# Patient Record
Sex: MALE | Race: BLACK OR AFRICAN AMERICAN | NOT HISPANIC OR LATINO
[De-identification: names, ages, dates, MRNs, and addresses within clinical notes are randomized per-mention and may not be internally consistent; named-entity substitution may affect disease eponyms.]

---

## 2017-02-10 ENCOUNTER — LABORATORY RESULT (OUTPATIENT)
Age: 57
End: 2017-02-10

## 2017-02-10 ENCOUNTER — APPOINTMENT (OUTPATIENT)
Dept: INTERNAL MEDICINE | Facility: CLINIC | Age: 57
End: 2017-02-10

## 2017-02-10 VITALS
DIASTOLIC BLOOD PRESSURE: 75 MMHG | OXYGEN SATURATION: 94 % | HEART RATE: 82 BPM | TEMPERATURE: 98.9 F | SYSTOLIC BLOOD PRESSURE: 152 MMHG

## 2017-02-10 VITALS — HEIGHT: 70 IN | BODY MASS INDEX: 36.79 KG/M2 | WEIGHT: 257 LBS

## 2017-02-10 LAB
BILIRUB UR QL STRIP: NEGATIVE
CLARITY UR: CLEAR
COLLECTION METHOD: NORMAL
GLUCOSE UR-MCNC: NEGATIVE
HCG UR QL: 0.2 EU/DL
HGB UR QL STRIP.AUTO: NEGATIVE
KETONES UR-MCNC: NORMAL
LEUKOCYTE ESTERASE UR QL STRIP: NEGATIVE
NITRITE UR QL STRIP: NEGATIVE
PH UR STRIP: 5
PROT UR STRIP-MCNC: NEGATIVE
SP GR UR STRIP: >=1.03

## 2017-02-12 LAB
25(OH)D3 SERPL-MCNC: 22.3 NG/ML
ALBUMIN SERPL ELPH-MCNC: 4.7 G/DL
ALP BLD-CCNC: 82 U/L
ALT SERPL-CCNC: 25 U/L
ANION GAP SERPL CALC-SCNC: 14 MMOL/L
AST SERPL-CCNC: 25 U/L
BASOPHILS # BLD AUTO: 0.03 K/UL
BASOPHILS NFR BLD AUTO: 0.5 %
BILIRUB SERPL-MCNC: 0.8 MG/DL
BUN SERPL-MCNC: 16 MG/DL
CALCIUM SERPL-MCNC: 10.6 MG/DL
CHLORIDE SERPL-SCNC: 101 MMOL/L
CHOLEST SERPL-MCNC: 180 MG/DL
CHOLEST/HDLC SERPL: 3.9 RATIO
CO2 SERPL-SCNC: 26 MMOL/L
CREAT SERPL-MCNC: 1.1 MG/DL
CREAT SPEC-SCNC: 205 MG/DL
EOSINOPHIL # BLD AUTO: 0.11 K/UL
EOSINOPHIL NFR BLD AUTO: 1.9 %
GLUCOSE SERPL-MCNC: 136 MG/DL
HBA1C MFR BLD HPLC: 7.5 %
HCT VFR BLD CALC: 45.4 %
HDLC SERPL-MCNC: 46 MG/DL
HGB BLD-MCNC: 14.1 G/DL
IMM GRANULOCYTES NFR BLD AUTO: 0.2 %
LDLC SERPL CALC-MCNC: 105 MG/DL
LYMPHOCYTES # BLD AUTO: 2.16 K/UL
LYMPHOCYTES NFR BLD AUTO: 38.1 %
MAN DIFF?: NORMAL
MCHC RBC-ENTMCNC: 29.1 PG
MCHC RBC-ENTMCNC: 31.1 GM/DL
MCV RBC AUTO: 93.6 FL
MICROALBUMIN 24H UR DL<=1MG/L-MCNC: 1.5 MG/DL
MICROALBUMIN/CREAT 24H UR-RTO: 7 UG/MG
MONOCYTES # BLD AUTO: 0.46 K/UL
MONOCYTES NFR BLD AUTO: 8.1 %
NEUTROPHILS # BLD AUTO: 2.9 K/UL
NEUTROPHILS NFR BLD AUTO: 51.2 %
PLATELET # BLD AUTO: 267 K/UL
POTASSIUM SERPL-SCNC: 4.7 MMOL/L
PROT SERPL-MCNC: 7.4 G/DL
PSA SERPL-MCNC: 5.39 NG/ML
RBC # BLD: 4.85 M/UL
RBC # FLD: 14.1 %
SODIUM SERPL-SCNC: 140 MMOL/L
T4 SERPL-MCNC: 6.4 UG/DL
TRIGL SERPL-MCNC: 143 MG/DL
TSH SERPL-ACNC: 1.92 UIU/ML
WBC # FLD AUTO: 5.67 K/UL

## 2017-06-01 ENCOUNTER — APPOINTMENT (OUTPATIENT)
Dept: INTERNAL MEDICINE | Facility: CLINIC | Age: 57
End: 2017-06-01

## 2017-06-01 VITALS
SYSTOLIC BLOOD PRESSURE: 144 MMHG | WEIGHT: 263 LBS | DIASTOLIC BLOOD PRESSURE: 87 MMHG | BODY MASS INDEX: 37.74 KG/M2 | HEART RATE: 80 BPM | OXYGEN SATURATION: 93 %

## 2017-08-21 ENCOUNTER — MEDICATION RENEWAL (OUTPATIENT)
Age: 57
End: 2017-08-21

## 2017-09-20 ENCOUNTER — MED ADMIN CHARGE (OUTPATIENT)
Age: 57
End: 2017-09-20

## 2017-12-05 ENCOUNTER — MEDICATION RENEWAL (OUTPATIENT)
Age: 57
End: 2017-12-05

## 2018-11-27 ENCOUNTER — MEDICATION RENEWAL (OUTPATIENT)
Age: 58
End: 2018-11-27

## 2018-12-03 ENCOUNTER — APPOINTMENT (OUTPATIENT)
Dept: INTERNAL MEDICINE | Facility: CLINIC | Age: 58
End: 2018-12-03

## 2019-01-04 ENCOUNTER — NON-APPOINTMENT (OUTPATIENT)
Age: 59
End: 2019-01-04

## 2019-01-04 ENCOUNTER — APPOINTMENT (OUTPATIENT)
Dept: INTERNAL MEDICINE | Facility: CLINIC | Age: 59
End: 2019-01-04
Payer: COMMERCIAL

## 2019-01-04 VITALS
DIASTOLIC BLOOD PRESSURE: 85 MMHG | SYSTOLIC BLOOD PRESSURE: 140 MMHG | OXYGEN SATURATION: 96 % | HEIGHT: 70 IN | TEMPERATURE: 98.3 F | BODY MASS INDEX: 35.07 KG/M2 | HEART RATE: 79 BPM | WEIGHT: 245 LBS

## 2019-01-04 DIAGNOSIS — H60.92 UNSPECIFIED OTITIS EXTERNA, LEFT EAR: ICD-10-CM

## 2019-01-04 DIAGNOSIS — Z86.69 PERSONAL HISTORY OF OTHER DISEASES OF THE NERVOUS SYSTEM AND SENSE ORGANS: ICD-10-CM

## 2019-01-04 DIAGNOSIS — M54.12 RADICULOPATHY, CERVICAL REGION: ICD-10-CM

## 2019-01-04 PROCEDURE — 36415 COLL VENOUS BLD VENIPUNCTURE: CPT

## 2019-01-04 PROCEDURE — 93000 ELECTROCARDIOGRAM COMPLETE: CPT

## 2019-01-04 PROCEDURE — 99396 PREV VISIT EST AGE 40-64: CPT | Mod: 25

## 2019-01-04 PROCEDURE — 81003 URINALYSIS AUTO W/O SCOPE: CPT | Mod: QW

## 2019-01-04 RX ORDER — COLISTIN SULFATE, NEOMYCIN SULFATE, THONZONIUM BROMIDE AND HYDROCORTISONE ACETATE 3; 3.3; .5; 1 MG/ML; MG/ML; MG/ML; MG/ML
3.3-3-10-0.5 SUSPENSION AURICULAR (OTIC) 4 TIMES DAILY
Qty: 1 | Refills: 0 | Status: DISCONTINUED | OUTPATIENT
Start: 2017-06-01 | End: 2019-01-04

## 2019-01-04 RX ORDER — SILDENAFIL CITRATE 100 MG/1
100 TABLET, FILM COATED ORAL
Qty: 6 | Refills: 3 | Status: DISCONTINUED | COMMUNITY
Start: 2017-08-21 | End: 2019-01-04

## 2019-01-04 RX ORDER — LISINOPRIL 5 MG/1
5 TABLET ORAL
Qty: 90 | Refills: 3 | Status: DISCONTINUED | COMMUNITY
Start: 2017-02-10 | End: 2019-01-04

## 2019-01-04 NOTE — HISTORY OF PRESENT ILLNESS
[FreeTextEntry1] : Annual exam [de-identified] : Annual exam\par Decline vaccine\par last colon 2009\par \par Diabetic last A1c 7.3\par does not do FS\par no recent opth??\par last visit elevated psa did not f/u urologist\par See cardiogist Osvaldo Madison\par on metformin, Ace and statin\par Works construction\par some left hand issue\par some tightening of right shoulder muscle\par no sob or cp\par after sleep left hand numb\par couple days ago awoke with headaches\par went to er ct neg, chest x ray and ekg all negative\par now headaches better\par sleep apnea not using mask at this time?

## 2019-01-04 NOTE — HEALTH RISK ASSESSMENT
[Good] : ~his/her~  mood as  good [No falls in past year] : Patient reported no falls in the past year [0] : 2) Feeling down, depressed, or hopeless: Not at all (0) [Patient reported colonoscopy was normal] : Patient reported colonoscopy was normal [None] : None [With Family] : lives with family [] : No [[id number]] : 0 [Change in mental status noted] : No change in mental status noted [Language] : denies difficulty with language [Behavior] : denies difficulty with behavior [Learning/Retaining New Information] : denies difficulty learning/retaining new information [Handling Complex Tasks] : denies difficulty handling complex tasks [Reasoning] : denies difficulty with reasoning [Spatial Ability and Orientation] : denies difficulty with spatial ability and orientation [ColonoscopyDate] : 4/09

## 2019-01-04 NOTE — PHYSICAL EXAM
[No Acute Distress] : no acute distress [Normal Outer Ear/Nose] : the outer ears and nose were normal in appearance [Normal Oropharynx] : the oropharynx was normal [No JVD] : no jugular venous distention [Supple] : supple [No Respiratory Distress] : no respiratory distress  [Clear to Auscultation] : lungs were clear to auscultation bilaterally [Normal Rate] : normal rate  [Regular Rhythm] : with a regular rhythm [No Carotid Bruits] : no carotid bruits [Pedal Pulses Present] : the pedal pulses are present [No Edema] : there was no peripheral edema [No Extremity Clubbing/Cyanosis] : no extremity clubbing/cyanosis [Soft] : abdomen soft [No HSM] : no HSM [Stool Occult Blood] : stool negative for occult blood [FreeTextEntry1] : prostate normal exam [de-identified] : right big toe nail issue

## 2019-01-04 NOTE — REVIEW OF SYSTEMS
[Fever] : no fever [Discharge] : no discharge [Earache] : no earache [Hearing Loss] : no hearing loss [Nasal Discharge] : no nasal discharge [Chest Pain] : no chest pain [Orthopena] : no orthopnea [Shortness Of Breath] : no shortness of breath [Wheezing] : no wheezing [Abdominal Pain] : no abdominal pain [Vomiting] : no vomiting [Joint Pain] : no joint pain [Back Pain] : no back pain

## 2019-01-04 NOTE — PLAN
[FreeTextEntry1] : Annual exam\par Diabetes on med \par not on statin or ace\par no eye or foot\par \par \par Full blood evaluation\par urine to check protein\par check cholesterol \par bp good but will check for micro albumin consider ace\par need f/u urology\par

## 2019-01-05 LAB
25(OH)D3 SERPL-MCNC: 20 NG/ML
ALBUMIN SERPL ELPH-MCNC: 4.5 G/DL
ALP BLD-CCNC: 50 U/L
ALT SERPL-CCNC: 20 U/L
ANION GAP SERPL CALC-SCNC: 10 MMOL/L
AST SERPL-CCNC: 25 U/L
BASOPHILS # BLD AUTO: 0.03 K/UL
BASOPHILS NFR BLD AUTO: 0.8 %
BILIRUB SERPL-MCNC: 1.6 MG/DL
BILIRUB UR QL STRIP: NEGATIVE
BUN SERPL-MCNC: 11 MG/DL
CALCIUM SERPL-MCNC: 9.6 MG/DL
CHLORIDE SERPL-SCNC: 100 MMOL/L
CHOLEST SERPL-MCNC: 128 MG/DL
CHOLEST/HDLC SERPL: 2.5 RATIO
CLARITY UR: CLEAR
CO2 SERPL-SCNC: 29 MMOL/L
COLLECTION METHOD: NORMAL
CREAT SERPL-MCNC: 1.03 MG/DL
CREAT SPEC-SCNC: 167 MG/DL
EOSINOPHIL # BLD AUTO: 0.04 K/UL
EOSINOPHIL NFR BLD AUTO: 1 %
GLUCOSE SERPL-MCNC: 108 MG/DL
GLUCOSE UR-MCNC: NEGATIVE
HBA1C MFR BLD HPLC: 6.5 %
HCG UR QL: 0.2 EU/DL
HCT VFR BLD CALC: 40.7 %
HDLC SERPL-MCNC: 52 MG/DL
HGB BLD-MCNC: 12.9 G/DL
HGB UR QL STRIP.AUTO: NEGATIVE
IMM GRANULOCYTES NFR BLD AUTO: 0.3 %
KETONES UR-MCNC: NEGATIVE
LDLC SERPL CALC-MCNC: 66 MG/DL
LEUKOCYTE ESTERASE UR QL STRIP: NEGATIVE
LYMPHOCYTES # BLD AUTO: 1.81 K/UL
LYMPHOCYTES NFR BLD AUTO: 45.7 %
MAN DIFF?: NORMAL
MCHC RBC-ENTMCNC: 29.5 PG
MCHC RBC-ENTMCNC: 31.7 GM/DL
MCV RBC AUTO: 92.9 FL
MICROALBUMIN 24H UR DL<=1MG/L-MCNC: 1.5 MG/DL
MICROALBUMIN/CREAT 24H UR-RTO: 9 MG/G
MONOCYTES # BLD AUTO: 0.37 K/UL
MONOCYTES NFR BLD AUTO: 9.3 %
NEUTROPHILS # BLD AUTO: 1.7 K/UL
NEUTROPHILS NFR BLD AUTO: 42.9 %
NITRITE UR QL STRIP: NEGATIVE
PH UR STRIP: 5
PLATELET # BLD AUTO: 260 K/UL
POTASSIUM SERPL-SCNC: 4.8 MMOL/L
PROT SERPL-MCNC: 6.6 G/DL
PROT UR STRIP-MCNC: NEGATIVE
PSA FREE FLD-MCNC: 6 %
PSA FREE SERPL-MCNC: 0.72 NG/ML
PSA SERPL-MCNC: 12.51 NG/ML
RBC # BLD: 4.38 M/UL
RBC # FLD: 13.8 %
SODIUM SERPL-SCNC: 139 MMOL/L
SP GR UR STRIP: 1.02
T4 SERPL-MCNC: 6.3 UG/DL
TRIGL SERPL-MCNC: 50 MG/DL
TSH SERPL-ACNC: 1.87 UIU/ML
WBC # FLD AUTO: 3.96 K/UL

## 2019-01-22 ENCOUNTER — FORM ENCOUNTER (OUTPATIENT)
Age: 59
End: 2019-01-22

## 2019-01-23 ENCOUNTER — OUTPATIENT (OUTPATIENT)
Dept: OUTPATIENT SERVICES | Facility: HOSPITAL | Age: 59
LOS: 1 days | End: 2019-01-23
Payer: COMMERCIAL

## 2019-01-23 ENCOUNTER — APPOINTMENT (OUTPATIENT)
Dept: MRI IMAGING | Facility: IMAGING CENTER | Age: 59
End: 2019-01-23
Payer: COMMERCIAL

## 2019-01-23 DIAGNOSIS — M54.12 RADICULOPATHY, CERVICAL REGION: ICD-10-CM

## 2019-01-23 PROCEDURE — 72141 MRI NECK SPINE W/O DYE: CPT | Mod: 26

## 2019-01-23 PROCEDURE — 72141 MRI NECK SPINE W/O DYE: CPT

## 2019-05-10 ENCOUNTER — MEDICATION RENEWAL (OUTPATIENT)
Age: 59
End: 2019-05-10

## 2019-07-01 ENCOUNTER — APPOINTMENT (OUTPATIENT)
Dept: INTERNAL MEDICINE | Facility: CLINIC | Age: 59
End: 2019-07-01
Payer: COMMERCIAL

## 2019-07-01 VITALS
SYSTOLIC BLOOD PRESSURE: 130 MMHG | HEART RATE: 76 BPM | DIASTOLIC BLOOD PRESSURE: 70 MMHG | WEIGHT: 245 LBS | BODY MASS INDEX: 35.07 KG/M2 | HEIGHT: 70 IN

## 2019-07-01 DIAGNOSIS — M25.431 EFFUSION, RIGHT WRIST: ICD-10-CM

## 2019-07-01 PROCEDURE — 36415 COLL VENOUS BLD VENIPUNCTURE: CPT

## 2019-07-01 PROCEDURE — 99214 OFFICE O/P EST MOD 30 MIN: CPT | Mod: 25

## 2019-07-01 NOTE — PHYSICAL EXAM
[No Acute Distress] : no acute distress [Normal Outer Ear/Nose] : the outer ears and nose were normal in appearance [Normal Oropharynx] : the oropharynx was normal [No JVD] : no jugular venous distention [No Lymphadenopathy] : no lymphadenopathy [No Respiratory Distress] : no respiratory distress  [No Accessory Muscle Use] : no accessory muscle use [Normal Rate] : normal rate  [Regular Rhythm] : with a regular rhythm [No Edema] : there was no peripheral edema

## 2019-07-01 NOTE — REVIEW OF SYSTEMS
[Joint Pain] : joint pain [Chest Pain] : no chest pain [Orthopena] : no orthopnea [Shortness Of Breath] : no shortness of breath [Wheezing] : no wheezing [Abdominal Pain] : no abdominal pain [Dysuria] : no dysuria [Hematuria] : no hematuria [Headache] : no headache

## 2019-07-01 NOTE — PLAN
[FreeTextEntry1] : r/o gout\par blood pressure good\par check blood\par trial of colchicine 0.6 bid\par refer to urology

## 2019-07-01 NOTE — HISTORY OF PRESENT ILLNESS
[FreeTextEntry8] : 2 weeks right wrist swollen\par not painful\par no injury\par work construction but left handed\par history of diabetes

## 2019-07-02 LAB
BASOPHILS # BLD AUTO: 0.04 K/UL
BASOPHILS NFR BLD AUTO: 0.6 %
CHOLEST SERPL-MCNC: 140 MG/DL
CHOLEST/HDLC SERPL: 2.6 RATIO
EOSINOPHIL # BLD AUTO: 0.09 K/UL
EOSINOPHIL NFR BLD AUTO: 1.3 %
ESTIMATED AVERAGE GLUCOSE: 143 MG/DL
HBA1C MFR BLD HPLC: 6.6 %
HCT VFR BLD CALC: 41.1 %
HDLC SERPL-MCNC: 54 MG/DL
HGB BLD-MCNC: 13 G/DL
IMM GRANULOCYTES NFR BLD AUTO: 0.4 %
LDLC SERPL CALC-MCNC: 75 MG/DL
LYMPHOCYTES # BLD AUTO: 3.02 K/UL
LYMPHOCYTES NFR BLD AUTO: 43.3 %
MAN DIFF?: NORMAL
MCHC RBC-ENTMCNC: 29.4 PG
MCHC RBC-ENTMCNC: 31.6 GM/DL
MCV RBC AUTO: 93 FL
MONOCYTES # BLD AUTO: 0.61 K/UL
MONOCYTES NFR BLD AUTO: 8.8 %
NEUTROPHILS # BLD AUTO: 3.18 K/UL
NEUTROPHILS NFR BLD AUTO: 45.6 %
PLATELET # BLD AUTO: 254 K/UL
PSA FREE FLD-MCNC: 5 %
PSA FREE SERPL-MCNC: 0.96 NG/ML
PSA SERPL-MCNC: 20 NG/ML
RBC # BLD: 4.42 M/UL
RBC # FLD: 13.8 %
TRIGL SERPL-MCNC: 54 MG/DL
URATE SERPL-MCNC: 4.3 MG/DL
WBC # FLD AUTO: 6.97 K/UL

## 2019-07-26 DIAGNOSIS — M65.9 SYNOVITIS AND TENOSYNOVITIS, UNSPECIFIED: ICD-10-CM

## 2019-07-26 RX ORDER — COLCHICINE 0.6 MG/1
0.6 TABLET, FILM COATED ORAL
Qty: 60 | Refills: 3 | Status: DISCONTINUED | COMMUNITY
Start: 2019-07-01 | End: 2019-07-26

## 2019-10-15 ENCOUNTER — APPOINTMENT (OUTPATIENT)
Dept: INTERNAL MEDICINE | Facility: CLINIC | Age: 59
End: 2019-10-15
Payer: COMMERCIAL

## 2019-10-15 VITALS
OXYGEN SATURATION: 93 % | HEIGHT: 70 IN | DIASTOLIC BLOOD PRESSURE: 77 MMHG | BODY MASS INDEX: 37.22 KG/M2 | SYSTOLIC BLOOD PRESSURE: 131 MMHG | WEIGHT: 260 LBS | HEART RATE: 70 BPM | TEMPERATURE: 98.2 F

## 2019-10-15 DIAGNOSIS — T78.40XA ALLERGY, UNSPECIFIED, INITIAL ENCOUNTER: ICD-10-CM

## 2019-10-15 PROCEDURE — 99214 OFFICE O/P EST MOD 30 MIN: CPT | Mod: 25

## 2019-10-15 PROCEDURE — 36415 COLL VENOUS BLD VENIPUNCTURE: CPT

## 2019-10-15 NOTE — PLAN
[FreeTextEntry1] : allergic reaction\par trial of prednisone 40 for 2 parker  nd 20 for 2 day\par continue allegra\par \par warned about sugar elevation and diabetes\par did not see urologist????\par repeat psa\par stressed need to see urologist

## 2019-10-15 NOTE — HISTORY OF PRESENT ILLNESS
[de-identified] : rub something eye\par eye swollen\par tender under right eye and both eye swollen almost shut\par \par did not see urologist\par  [FreeTextEntry1] : eye issue

## 2019-10-15 NOTE — PHYSICAL EXAM
[No Acute Distress] : no acute distress [Normal Outer Ear/Nose] : the outer ears and nose were normal in appearance [No JVD] : no jugular venous distention [No Lymphadenopathy] : no lymphadenopathy [No Respiratory Distress] : no respiratory distress  [de-identified] : swollen

## 2019-10-16 LAB
ALBUMIN SERPL ELPH-MCNC: 4.6 G/DL
ALP BLD-CCNC: 68 U/L
ALT SERPL-CCNC: 24 U/L
ANION GAP SERPL CALC-SCNC: 13 MMOL/L
AST SERPL-CCNC: 29 U/L
BASOPHILS # BLD AUTO: 0.05 K/UL
BASOPHILS NFR BLD AUTO: 0.7 %
BILIRUB SERPL-MCNC: 0.9 MG/DL
BUN SERPL-MCNC: 17 MG/DL
CALCIUM SERPL-MCNC: 10 MG/DL
CHLORIDE SERPL-SCNC: 101 MMOL/L
CO2 SERPL-SCNC: 27 MMOL/L
CREAT SERPL-MCNC: 1.1 MG/DL
EOSINOPHIL # BLD AUTO: 0.19 K/UL
EOSINOPHIL NFR BLD AUTO: 2.7 %
ESTIMATED AVERAGE GLUCOSE: 140 MG/DL
GLUCOSE SERPL-MCNC: 103 MG/DL
HBA1C MFR BLD HPLC: 6.5 %
HCT VFR BLD CALC: 40.8 %
HGB BLD-MCNC: 12.9 G/DL
IMM GRANULOCYTES NFR BLD AUTO: 0.3 %
LYMPHOCYTES # BLD AUTO: 2.6 K/UL
LYMPHOCYTES NFR BLD AUTO: 37.3 %
MAN DIFF?: NORMAL
MCHC RBC-ENTMCNC: 29.4 PG
MCHC RBC-ENTMCNC: 31.6 GM/DL
MCV RBC AUTO: 92.9 FL
MONOCYTES # BLD AUTO: 0.59 K/UL
MONOCYTES NFR BLD AUTO: 8.5 %
NEUTROPHILS # BLD AUTO: 3.52 K/UL
NEUTROPHILS NFR BLD AUTO: 50.5 %
PLATELET # BLD AUTO: 209 K/UL
POTASSIUM SERPL-SCNC: 4.7 MMOL/L
PROT SERPL-MCNC: 7.1 G/DL
PSA FREE FLD-MCNC: 5 %
PSA FREE SERPL-MCNC: 1.16 NG/ML
PSA SERPL-MCNC: 25 NG/ML
RBC # BLD: 4.39 M/UL
RBC # FLD: 13.5 %
SODIUM SERPL-SCNC: 141 MMOL/L
WBC # FLD AUTO: 6.97 K/UL

## 2020-01-10 ENCOUNTER — RX RENEWAL (OUTPATIENT)
Age: 60
End: 2020-01-10

## 2020-01-11 ENCOUNTER — MOBILE ON CALL (OUTPATIENT)
Age: 60
End: 2020-01-11

## 2020-06-18 ENCOUNTER — APPOINTMENT (OUTPATIENT)
Dept: INTERNAL MEDICINE | Facility: CLINIC | Age: 60
End: 2020-06-18
Payer: COMMERCIAL

## 2020-06-18 ENCOUNTER — NON-APPOINTMENT (OUTPATIENT)
Age: 60
End: 2020-06-18

## 2020-06-18 VITALS
SYSTOLIC BLOOD PRESSURE: 151 MMHG | RESPIRATION RATE: 17 BRPM | BODY MASS INDEX: 36.16 KG/M2 | HEART RATE: 64 BPM | OXYGEN SATURATION: 98 % | WEIGHT: 252.04 LBS | DIASTOLIC BLOOD PRESSURE: 81 MMHG

## 2020-06-18 DIAGNOSIS — G47.30 SLEEP APNEA, UNSPECIFIED: ICD-10-CM

## 2020-06-18 DIAGNOSIS — Z01.818 ENCOUNTER FOR OTHER PREPROCEDURAL EXAMINATION: ICD-10-CM

## 2020-06-18 PROCEDURE — 93000 ELECTROCARDIOGRAM COMPLETE: CPT

## 2020-06-18 PROCEDURE — 99214 OFFICE O/P EST MOD 30 MIN: CPT | Mod: 25

## 2020-06-18 RX ORDER — PREDNISONE 20 MG/1
20 TABLET ORAL
Qty: 6 | Refills: 0 | Status: DISCONTINUED | COMMUNITY
Start: 2019-10-15 | End: 2020-06-18

## 2020-06-18 RX ORDER — INDOMETHACIN 50 MG/1
50 CAPSULE ORAL
Qty: 30 | Refills: 2 | Status: DISCONTINUED | COMMUNITY
Start: 2019-07-26 | End: 2020-06-18

## 2020-06-18 RX ORDER — SILDENAFIL 20 MG/1
20 TABLET ORAL
Qty: 50 | Refills: 5 | Status: DISCONTINUED | COMMUNITY
Start: 2019-01-04 | End: 2020-06-18

## 2020-06-18 RX ORDER — SILDENAFIL CITRATE 100 MG/1
100 TABLET, FILM COATED ORAL
Qty: 6 | Refills: 5 | Status: DISCONTINUED | COMMUNITY
Start: 2020-04-08 | End: 2020-06-18

## 2020-06-18 NOTE — HISTORY OF PRESENT ILLNESS
[No Pertinent Cardiac History] : no history of aortic stenosis, atrial fibrillation, coronary artery disease, recent myocardial infarction, or implantable device/pacemaker [Sleep Apnea] : sleep apnea [No Adverse Anesthesia Reaction] : no adverse anesthesia reaction in self or family member [Asthma] : no asthma [Smoker] : not a smoker [COPD] : no COPD [(Patient denies any chest pain, claudication, dyspnea on exertion, orthopnea, palpitations or syncope)] : Patient denies any chest pain, claudication, dyspnea on exertion, orthopnea, palpitations or syncope [FreeTextEntry1] : Prostate Surgery [FreeTextEntry2] : june 25 [FreeTextEntry3] : Dr Laws [FreeTextEntry4] : Pre op prostate surgery for prostate cancer\par mild diabetes  A1c 6.5 metformin\par sleep apnea No cpap\par

## 2020-06-18 NOTE — PLAN
[FreeTextEntry1] : Pre op prostate surgery\par Sleep apnea not on cpap\par diabetes on metformin last a1c 6.5]\par \par \par Past pending\par Medically cleared for surgery\par Hold metformin day of surgery

## 2021-04-15 ENCOUNTER — APPOINTMENT (OUTPATIENT)
Dept: OPHTHALMOLOGY | Facility: CLINIC | Age: 61
End: 2021-04-15
Payer: COMMERCIAL

## 2021-04-15 ENCOUNTER — NON-APPOINTMENT (OUTPATIENT)
Age: 61
End: 2021-04-15

## 2021-04-15 PROCEDURE — 99072 ADDL SUPL MATRL&STAF TM PHE: CPT

## 2021-04-15 PROCEDURE — 92004 COMPRE OPH EXAM NEW PT 1/>: CPT

## 2021-07-20 ENCOUNTER — APPOINTMENT (OUTPATIENT)
Dept: INTERNAL MEDICINE | Facility: CLINIC | Age: 61
End: 2021-07-20
Payer: COMMERCIAL

## 2021-07-20 ENCOUNTER — NON-APPOINTMENT (OUTPATIENT)
Age: 61
End: 2021-07-20

## 2021-07-20 VITALS
DIASTOLIC BLOOD PRESSURE: 84 MMHG | SYSTOLIC BLOOD PRESSURE: 130 MMHG | OXYGEN SATURATION: 96 % | BODY MASS INDEX: 36.51 KG/M2 | HEART RATE: 85 BPM | HEIGHT: 70 IN | TEMPERATURE: 98 F | WEIGHT: 255 LBS

## 2021-07-20 PROCEDURE — 99396 PREV VISIT EST AGE 40-64: CPT | Mod: 25

## 2021-07-20 PROCEDURE — 81003 URINALYSIS AUTO W/O SCOPE: CPT | Mod: QW

## 2021-07-20 PROCEDURE — 93000 ELECTROCARDIOGRAM COMPLETE: CPT

## 2021-07-20 NOTE — REVIEW OF SYSTEMS
[Incontinence] : no incontinence [Nocturia] : no nocturia [Frequency] : frequency [Negative] : Heme/Lymph [FreeTextEntry7] : mucoous

## 2021-07-20 NOTE — HISTORY OF PRESENT ILLNESS
[FreeTextEntry1] : Annual exam [de-identified] : Annual exam\par decline all vaccine\par no covid shot??\par last colon 2009\par \par finished radiation 3 weeks ago for proastate ca\par s/p surgery and o lupron\par last psa less pauly 0.1\par other than sweat feel ok\par diabetes does nt check blood\par on metformin\par some mild side effects locally from radiation

## 2021-07-20 NOTE — HEALTH RISK ASSESSMENT
[Good] : ~his/her~  mood as  good [Yes] : Yes [2 - 4 times a month (2 pts)] : 2-4 times a month (2 points) [Never (0 pts)] : Never (0 points) [No falls in past year] : Patient reported no falls in the past year [0] : 2) Feeling down, depressed, or hopeless: Not at all (0) [None] : None [With Significant Other] : lives with significant other [Employed] : employed [High School] : high school [Significant Other] : lives with significant other [Feels Safe at Home] : Feels safe at home [Fully functional (bathing, dressing, toileting, transferring, walking, feeding)] : Fully functional (bathing, dressing, toileting, transferring, walking, feeding) [Fully functional (using the telephone, shopping, preparing meals, housekeeping, doing laundry, using] : Fully functional and needs no help or supervision to perform IADLs (using the telephone, shopping, preparing meals, housekeeping, doing laundry, using transportation, managing medications and managing finances) [Smoke Detector] : smoke detector [Carbon Monoxide Detector] : carbon monoxide detector [Seat Belt] :  uses seat belt [Patient declined colonoscopy] : Patient declined colonoscopy [] : No [[id number]] : 0 [Change in mental status noted] : No change in mental status noted [Language] : denies difficulty with language [Behavior] : denies difficulty with behavior [Learning/Retaining New Information] : denies difficulty learning/retaining new information [Handling Complex Tasks] : denies difficulty handling complex tasks [Reasoning] : denies difficulty with reasoning [Spatial Ability and Orientation] : denies difficulty with spatial ability and orientation [Reports changes in dental health] : Reports no changes in dental health [Guns at Home] : no guns at home [ColonoscopyComments] : Temporary defer

## 2021-07-20 NOTE — PLAN
[FreeTextEntry1] : Annual exam\par strongly urged for covid shot as is very high risk\par \par defer colon until 3-6 month after radiation finished\par diabetes check blood\par check cholesterol\par bp good

## 2021-07-23 LAB
25(OH)D3 SERPL-MCNC: 21.6 NG/ML
ALBUMIN SERPL ELPH-MCNC: 4.6 G/DL
ALP BLD-CCNC: 98 U/L
ALT SERPL-CCNC: 19 U/L
ANION GAP SERPL CALC-SCNC: 13 MMOL/L
AST SERPL-CCNC: 20 U/L
BASOPHILS # BLD AUTO: 0.02 K/UL
BASOPHILS NFR BLD AUTO: 0.6 %
BILIRUB SERPL-MCNC: 0.4 MG/DL
BILIRUB UR QL STRIP: NEGATIVE
BUN SERPL-MCNC: 15 MG/DL
CALCIUM SERPL-MCNC: 10.2 MG/DL
CHLORIDE SERPL-SCNC: 104 MMOL/L
CHOLEST SERPL-MCNC: 183 MG/DL
CO2 SERPL-SCNC: 24 MMOL/L
CREAT SERPL-MCNC: 0.94 MG/DL
EOSINOPHIL # BLD AUTO: 0.12 K/UL
EOSINOPHIL NFR BLD AUTO: 3.4 %
ESTIMATED AVERAGE GLUCOSE: 192 MG/DL
GLUCOSE SERPL-MCNC: 194 MG/DL
GLUCOSE UR-MCNC: NEGATIVE
HBA1C MFR BLD HPLC: 8.3 %
HCG UR QL: 0.2 EU/DL
HCT VFR BLD CALC: 37.9 %
HDLC SERPL-MCNC: 43 MG/DL
HGB BLD-MCNC: 11.7 G/DL
HGB UR QL STRIP.AUTO: NEGATIVE
IMM GRANULOCYTES NFR BLD AUTO: 0.6 %
KETONES UR-MCNC: NEGATIVE
LDLC SERPL CALC-MCNC: 94 MG/DL
LEUKOCYTE ESTERASE UR QL STRIP: NEGATIVE
LYMPHOCYTES # BLD AUTO: 0.79 K/UL
LYMPHOCYTES NFR BLD AUTO: 22.7 %
MAN DIFF?: NORMAL
MCHC RBC-ENTMCNC: 29.3 PG
MCHC RBC-ENTMCNC: 30.9 GM/DL
MCV RBC AUTO: 94.8 FL
MONOCYTES # BLD AUTO: 0.37 K/UL
MONOCYTES NFR BLD AUTO: 10.6 %
NEUTROPHILS # BLD AUTO: 2.16 K/UL
NEUTROPHILS NFR BLD AUTO: 62.1 %
NITRITE UR QL STRIP: NEGATIVE
NONHDLC SERPL-MCNC: 141 MG/DL
PH UR STRIP: 5
PLATELET # BLD AUTO: 221 K/UL
POTASSIUM SERPL-SCNC: 5.2 MMOL/L
PROT SERPL-MCNC: 6.9 G/DL
PROT UR STRIP-MCNC: NORMAL
RBC # BLD: 4 M/UL
RBC # FLD: 13.8 %
SODIUM SERPL-SCNC: 141 MMOL/L
SP GR UR STRIP: >=1.03
T4 FREE SERPL-MCNC: 0.9 NG/DL
TRIGL SERPL-MCNC: 237 MG/DL
TSH SERPL-ACNC: 1.95 UIU/ML
URATE SERPL-MCNC: 4.6 MG/DL
WBC # FLD AUTO: 3.48 K/UL

## 2021-09-01 ENCOUNTER — NON-APPOINTMENT (OUTPATIENT)
Age: 61
End: 2021-09-01

## 2021-11-08 ENCOUNTER — NON-APPOINTMENT (OUTPATIENT)
Age: 61
End: 2021-11-08

## 2021-11-08 DIAGNOSIS — M79.89 OTHER SPECIFIED SOFT TISSUE DISORDERS: ICD-10-CM

## 2021-11-09 ENCOUNTER — APPOINTMENT (OUTPATIENT)
Dept: ULTRASOUND IMAGING | Facility: CLINIC | Age: 61
End: 2021-11-09
Payer: COMMERCIAL

## 2021-11-09 ENCOUNTER — OUTPATIENT (OUTPATIENT)
Dept: OUTPATIENT SERVICES | Facility: HOSPITAL | Age: 61
LOS: 1 days | End: 2021-11-09
Payer: COMMERCIAL

## 2021-11-09 DIAGNOSIS — M79.89 OTHER SPECIFIED SOFT TISSUE DISORDERS: ICD-10-CM

## 2021-11-09 PROCEDURE — 93971 EXTREMITY STUDY: CPT

## 2021-11-09 PROCEDURE — 93971 EXTREMITY STUDY: CPT | Mod: 26

## 2021-11-15 ENCOUNTER — NON-APPOINTMENT (OUTPATIENT)
Age: 61
End: 2021-11-15

## 2021-11-15 ENCOUNTER — APPOINTMENT (OUTPATIENT)
Dept: INTERNAL MEDICINE | Facility: CLINIC | Age: 61
End: 2021-11-15
Payer: COMMERCIAL

## 2021-11-15 ENCOUNTER — EMERGENCY (EMERGENCY)
Facility: HOSPITAL | Age: 61
LOS: 1 days | Discharge: HOME | End: 2021-11-15
Attending: EMERGENCY MEDICINE | Admitting: EMERGENCY MEDICINE
Payer: COMMERCIAL

## 2021-11-15 VITALS
OXYGEN SATURATION: 100 % | HEART RATE: 72 BPM | RESPIRATION RATE: 18 BRPM | DIASTOLIC BLOOD PRESSURE: 100 MMHG | TEMPERATURE: 98 F | SYSTOLIC BLOOD PRESSURE: 154 MMHG | HEIGHT: 78 IN

## 2021-11-15 VITALS
HEIGHT: 70 IN | OXYGEN SATURATION: 97 % | WEIGHT: 260 LBS | HEART RATE: 67 BPM | BODY MASS INDEX: 37.22 KG/M2 | TEMPERATURE: 97.8 F | SYSTOLIC BLOOD PRESSURE: 135 MMHG | DIASTOLIC BLOOD PRESSURE: 84 MMHG

## 2021-11-15 VITALS
HEART RATE: 58 BPM | OXYGEN SATURATION: 100 % | SYSTOLIC BLOOD PRESSURE: 138 MMHG | TEMPERATURE: 98 F | DIASTOLIC BLOOD PRESSURE: 80 MMHG | RESPIRATION RATE: 16 BRPM

## 2021-11-15 DIAGNOSIS — R59.9 ENLARGED LYMPH NODES, UNSPECIFIED: ICD-10-CM

## 2021-11-15 DIAGNOSIS — Z90.79 ACQUIRED ABSENCE OF OTHER GENITAL ORGAN(S): Chronic | ICD-10-CM

## 2021-11-15 LAB
ALBUMIN SERPL ELPH-MCNC: 4.4 G/DL — SIGNIFICANT CHANGE UP (ref 3.3–5)
ALP SERPL-CCNC: 76 U/L — SIGNIFICANT CHANGE UP (ref 40–120)
ALT FLD-CCNC: 20 U/L — SIGNIFICANT CHANGE UP (ref 4–41)
ANION GAP SERPL CALC-SCNC: 9 MMOL/L — SIGNIFICANT CHANGE UP (ref 7–14)
AST SERPL-CCNC: 21 U/L — SIGNIFICANT CHANGE UP (ref 4–40)
BASOPHILS # BLD AUTO: 0.03 K/UL — SIGNIFICANT CHANGE UP (ref 0–0.2)
BASOPHILS NFR BLD AUTO: 0.8 % — SIGNIFICANT CHANGE UP (ref 0–2)
BILIRUB SERPL-MCNC: 0.7 MG/DL — SIGNIFICANT CHANGE UP (ref 0.2–1.2)
BUN SERPL-MCNC: 13 MG/DL — SIGNIFICANT CHANGE UP (ref 7–23)
CALCIUM SERPL-MCNC: 9.7 MG/DL — SIGNIFICANT CHANGE UP (ref 8.4–10.5)
CHLORIDE SERPL-SCNC: 102 MMOL/L — SIGNIFICANT CHANGE UP (ref 98–107)
CO2 SERPL-SCNC: 28 MMOL/L — SIGNIFICANT CHANGE UP (ref 22–31)
CREAT SERPL-MCNC: 0.98 MG/DL — SIGNIFICANT CHANGE UP (ref 0.5–1.3)
EOSINOPHIL # BLD AUTO: 0.09 K/UL — SIGNIFICANT CHANGE UP (ref 0–0.5)
EOSINOPHIL NFR BLD AUTO: 2.4 % — SIGNIFICANT CHANGE UP (ref 0–6)
GLUCOSE SERPL-MCNC: 129 MG/DL — HIGH (ref 70–99)
HCT VFR BLD CALC: 34.4 % — LOW (ref 39–50)
HGB BLD-MCNC: 11.4 G/DL — LOW (ref 13–17)
IANC: 2.25 K/UL — SIGNIFICANT CHANGE UP (ref 1.5–8.5)
IMM GRANULOCYTES NFR BLD AUTO: 0.3 % — SIGNIFICANT CHANGE UP (ref 0–1.5)
LYMPHOCYTES # BLD AUTO: 0.9 K/UL — LOW (ref 1–3.3)
LYMPHOCYTES # BLD AUTO: 24.3 % — SIGNIFICANT CHANGE UP (ref 13–44)
MCHC RBC-ENTMCNC: 30.2 PG — SIGNIFICANT CHANGE UP (ref 27–34)
MCHC RBC-ENTMCNC: 33.1 GM/DL — SIGNIFICANT CHANGE UP (ref 32–36)
MCV RBC AUTO: 91 FL — SIGNIFICANT CHANGE UP (ref 80–100)
MONOCYTES # BLD AUTO: 0.42 K/UL — SIGNIFICANT CHANGE UP (ref 0–0.9)
MONOCYTES NFR BLD AUTO: 11.4 % — SIGNIFICANT CHANGE UP (ref 2–14)
NEUTROPHILS # BLD AUTO: 2.25 K/UL — SIGNIFICANT CHANGE UP (ref 1.8–7.4)
NEUTROPHILS NFR BLD AUTO: 60.8 % — SIGNIFICANT CHANGE UP (ref 43–77)
NRBC # BLD: 0 /100 WBCS — SIGNIFICANT CHANGE UP
NRBC # FLD: 0 K/UL — SIGNIFICANT CHANGE UP
PLATELET # BLD AUTO: 208 K/UL — SIGNIFICANT CHANGE UP (ref 150–400)
POTASSIUM SERPL-MCNC: 4.3 MMOL/L — SIGNIFICANT CHANGE UP (ref 3.5–5.3)
POTASSIUM SERPL-SCNC: 4.3 MMOL/L — SIGNIFICANT CHANGE UP (ref 3.5–5.3)
PROT SERPL-MCNC: 6.7 G/DL — SIGNIFICANT CHANGE UP (ref 6–8.3)
RBC # BLD: 3.78 M/UL — LOW (ref 4.2–5.8)
RBC # FLD: 13.6 % — SIGNIFICANT CHANGE UP (ref 10.3–14.5)
SODIUM SERPL-SCNC: 139 MMOL/L — SIGNIFICANT CHANGE UP (ref 135–145)
WBC # BLD: 3.7 K/UL — LOW (ref 3.8–10.5)
WBC # FLD AUTO: 3.7 K/UL — LOW (ref 3.8–10.5)

## 2021-11-15 PROCEDURE — 93971 EXTREMITY STUDY: CPT | Mod: 26,LT

## 2021-11-15 PROCEDURE — 99285 EMERGENCY DEPT VISIT HI MDM: CPT

## 2021-11-15 PROCEDURE — 74177 CT ABD & PELVIS W/CONTRAST: CPT | Mod: 26,ME

## 2021-11-15 PROCEDURE — 99214 OFFICE O/P EST MOD 30 MIN: CPT

## 2021-11-15 PROCEDURE — G1004: CPT

## 2021-11-15 NOTE — ED PROVIDER NOTE - OBJECTIVE STATEMENT
patient is 60 y/o M pmhx of DM, prostate CA in the past, c/o left leg swelling x 1 week. he states that it started in his ankle and traveleed to the calf. on Wednesday he had a doppler ordered by his PCP which was negative for DVT, since then the swelling has traveled up to his thigh. patient states that it feels r\tight when walking but does not affect his activity. denies fever, chills, recent trave; patient is 62 y/o M pmhx of DM, prostatatectomy with radiation in June 2020, c/o left leg swelling x 1 week. he states that it started in his ankle and traveled to the calf. on Wednesday he had a doppler ordered by his PCP which was negative for DVT, since then the swelling has traveled up to his thigh. patient states that it feels tight when walking but does not affect his activity, denies redness or warmth to the extremity. denies fever, chills, recent travel, chest pain, sob, activity intolerance.

## 2021-11-15 NOTE — PHYSICAL EXAM
[Normal] : soft, non-tender, non-distended, no masses palpated, no HSM and normal bowel sounds [de-identified] : left thigh and left lower leg pitting edema [de-identified] : Inguinal adenoapthy not felt

## 2021-11-15 NOTE — ED ADULT NURSE NOTE - OBJECTIVE STATEMENT
Patient 61-year-old male, A&OX3, ambulatory prostate Ca, DM, obesity c/o left leg swelling. Pt states it started in his ankle. Breathing even and unlabored, chest rise equal b/l. 20 G to R AC. Labs drawn and sent. Will continue to monitor.

## 2021-11-15 NOTE — ED PROVIDER NOTE - PHYSICAL EXAMINATION
left leg swelling extending from ankle to thigh, no erythema, +dorsalis pedis, popliteal, femoral pulses. neurovascularly intact

## 2021-11-15 NOTE — ED ADULT NURSE NOTE - NSICDXPASTSURGICALHX_GEN_ALL_CORE_FT
Ivan Arambula(Attending) PAST SURGICAL HISTORY:  H/O prostatectomy     I & D of Abscess - rectal  6 yrs ago

## 2021-11-15 NOTE — ED PROVIDER NOTE - CLINICAL SUMMARY MEDICAL DECISION MAKING FREE TEXT BOX
patient is 62 y/o M pmhx of DM, prostatectomy with radiation in June 2020, c/o left leg swelling x 1 week. bedside US performed with no evidence of DVT. prominent left inguinal lymph node visualized. negative for cervical lymph swelling, no fever, chills, chest pain, sob -- labs, CT abdomen pelvis and follow up. patient is 62 y/o M pmhx of DM, prostatectomy with radiation in June 2020, c/o left leg swelling x 1 week. bedside US performed with no evidence of DVT. prominent left inguinal lymph node visualized. negative for cervical lymph swelling, no fever, chills, chest pain, sob -- labs, CT abdomen pelvis and follow up.    lala: pt is 62 yo man here with left leg swelling.  sono for dvt done 5 days ago and again today, both neg,  no si9gns oif infection or cellulitis.  pt does have large left sided lymphnode in the groin.  given hx cancer, will ct and if neg would dstill have pt follow with his oncologist.

## 2021-11-15 NOTE — ED PROVIDER NOTE - NSICDXPASTMEDICALHX_GEN_ALL_CORE_FT
PAST MEDICAL HISTORY:  Diabetes     Obesity     Prostate CA     Sleep Apnea - diagnosed 3-4 yrs ago - use CPAP regularly

## 2021-11-15 NOTE — ED PROVIDER NOTE - PATIENT PORTAL LINK FT
You can access the FollowMyHealth Patient Portal offered by [facility] by registering at the following website: [email or web address]. By joining [facility]’s FollowMyHealth portal, you will also be able to view your health information using other applications (apps) compatible with our system.

## 2021-11-15 NOTE — ED ADULT TRIAGE NOTE - CHIEF COMPLAINT QUOTE
pt c/o left leg pain and swelling x1 week. denies injury. amb to triage with regular gait.   hx- of type 2 dm

## 2021-11-15 NOTE — ED PROVIDER NOTE - PROGRESS NOTE DETAILS
Nella NP: patient states that he is feeling well. CT results negative for pathology. patient has follow up with his urologist for a PET scan. stable for discharge Reassessment performed and plan for discharge discussed with Dr. Erickson who agrees with disposition and discharge plan.

## 2021-11-15 NOTE — ED PROVIDER NOTE - ATTENDING CONTRIBUTION TO CARE
lala: pt is 62 yo man here with left leg swelling.  sono for dvt done 5 days ago and again today, both neg,  no si9gns oif infection or cellulitis.  pt does have large left sided lymphnode in the groin.  given hx cancer, will ct and if neg would dstill have pt follow with his oncologist.    I performed a history and physical exam of the patient and discussed their management with the resident and /or advanced care provider. I reviewed the resident and /or ACP's note and agree with the documented findings and plan of care. My medical decison making and observations are found above.

## 2021-11-15 NOTE — HISTORY OF PRESENT ILLNESS
[FreeTextEntry1] : Swollen left leg [de-identified] : Hsotry of aggressive prostate cancer\par developed swollen left leg\par doppler no dvt but bilateral inguinal node\par \par discussed with dr Kong\par Psa Very low\par Other issue??\par await pet scan\par May need IR biopsy?\par

## 2021-11-17 LAB
C TRACH RRNA SPEC QL NAA+PROBE: SIGNIFICANT CHANGE UP
N GONORRHOEA RRNA SPEC QL NAA+PROBE: SIGNIFICANT CHANGE UP
SPECIMEN SOURCE: SIGNIFICANT CHANGE UP

## 2021-11-29 PROBLEM — E11.9 TYPE 2 DIABETES MELLITUS WITHOUT COMPLICATIONS: Chronic | Status: ACTIVE | Noted: 2021-11-15

## 2021-11-29 PROBLEM — C61 MALIGNANT NEOPLASM OF PROSTATE: Chronic | Status: ACTIVE | Noted: 2021-11-15

## 2021-12-07 ENCOUNTER — APPOINTMENT (OUTPATIENT)
Dept: VASCULAR SURGERY | Facility: CLINIC | Age: 61
End: 2021-12-07
Payer: COMMERCIAL

## 2021-12-07 VITALS
SYSTOLIC BLOOD PRESSURE: 149 MMHG | WEIGHT: 255 LBS | TEMPERATURE: 97.9 F | DIASTOLIC BLOOD PRESSURE: 82 MMHG | HEIGHT: 70 IN | HEART RATE: 71 BPM | BODY MASS INDEX: 36.51 KG/M2

## 2021-12-07 PROCEDURE — 93970 EXTREMITY STUDY: CPT

## 2021-12-07 PROCEDURE — 93979 VASCULAR STUDY: CPT

## 2021-12-07 PROCEDURE — 99203 OFFICE O/P NEW LOW 30 MIN: CPT

## 2021-12-07 NOTE — ASSESSMENT
[FreeTextEntry1] : 60 yo male with history of dm, prostate ca s/p prostatectomy and radiation presents for evaluation of left lower extremity edema x 1 month\par \par venous duplex shows reflux in the gsv and left cfv and sfv, iliac veins were also visualized and were noted to be normal \par pt reports removal of 20 lymph nodes during the original prostatectomy about 1 - 2 years ago most likely the cause of the edema\par at this time no vascular surgical intervention would recommend compression stockings and elevation and will refer pt to lymphedema clinic \par pt to follow up as needed

## 2021-12-07 NOTE — HISTORY OF PRESENT ILLNESS
[FreeTextEntry1] : 62 yo male with history of dm, prostate ca s/p prostatectomy and LN dissection  and radiation presents for evaluation of left lower extremity edema x 1 month. pt states that the swelling increases over the course of the day.  pt denies any history of dvt, ulcers or wounds.  pt states that there is no history of claudications \par pt states that he does not wear compression stockings.

## 2021-12-07 NOTE — PHYSICAL EXAM
[Ankle Swelling (On Exam)] : present [Ankle Swelling Bilaterally] : severe [Varicose Veins Of Lower Extremities] : present [Varicose Veins Of The Right Leg] : of the right leg [Ankle Swelling On The Left] : moderate [No Rash or Lesion] : No rash or lesion [Alert] : alert [Calm] : calm [JVD] : no jugular venous distention  [2+] : left 2+ [] : not present [Skin Ulcer] : no ulcer [de-identified] : appears well  [de-identified] : no calf tenderness, no palpable cords

## 2022-01-19 ENCOUNTER — APPOINTMENT (OUTPATIENT)
Dept: NUCLEAR MEDICINE | Facility: IMAGING CENTER | Age: 62
End: 2022-01-19

## 2022-04-15 ENCOUNTER — APPOINTMENT (OUTPATIENT)
Dept: INTERNAL MEDICINE | Facility: CLINIC | Age: 62
End: 2022-04-15
Payer: COMMERCIAL

## 2022-04-15 ENCOUNTER — NON-APPOINTMENT (OUTPATIENT)
Age: 62
End: 2022-04-15

## 2022-04-15 VITALS
OXYGEN SATURATION: 95 % | BODY MASS INDEX: 37.22 KG/M2 | WEIGHT: 260 LBS | HEART RATE: 68 BPM | DIASTOLIC BLOOD PRESSURE: 83 MMHG | TEMPERATURE: 98.4 F | SYSTOLIC BLOOD PRESSURE: 160 MMHG | HEIGHT: 70 IN

## 2022-04-15 PROCEDURE — 99214 OFFICE O/P EST MOD 30 MIN: CPT | Mod: 25

## 2022-04-15 PROCEDURE — 93000 ELECTROCARDIOGRAM COMPLETE: CPT

## 2022-04-15 RX ORDER — BICALUTAMIDE 50 MG/1
50 TABLET ORAL
Qty: 30 | Refills: 0 | Status: DISCONTINUED | COMMUNITY
Start: 2021-02-04 | End: 2022-04-15

## 2022-04-17 LAB
25(OH)D3 SERPL-MCNC: 14.6 NG/ML
ALBUMIN SERPL ELPH-MCNC: 4.5 G/DL
ALP BLD-CCNC: 82 U/L
ALT SERPL-CCNC: 12 U/L
ANION GAP SERPL CALC-SCNC: 10 MMOL/L
AST SERPL-CCNC: 19 U/L
BASOPHILS # BLD AUTO: 0.03 K/UL
BASOPHILS NFR BLD AUTO: 0.8 %
BILIRUB SERPL-MCNC: 0.5 MG/DL
BUN SERPL-MCNC: 14 MG/DL
CALCIUM SERPL-MCNC: 9.7 MG/DL
CHLORIDE SERPL-SCNC: 105 MMOL/L
CHOLEST SERPL-MCNC: 156 MG/DL
CO2 SERPL-SCNC: 27 MMOL/L
CREAT SERPL-MCNC: 0.94 MG/DL
EGFR: 92 ML/MIN/1.73M2
EOSINOPHIL # BLD AUTO: 0.14 K/UL
EOSINOPHIL NFR BLD AUTO: 3.8 %
ESTIMATED AVERAGE GLUCOSE: 169 MG/DL
GLUCOSE SERPL-MCNC: 116 MG/DL
HBA1C MFR BLD HPLC: 7.5 %
HCT VFR BLD CALC: 35.5 %
HDLC SERPL-MCNC: 47 MG/DL
HGB BLD-MCNC: 10.6 G/DL
IMM GRANULOCYTES NFR BLD AUTO: 0.3 %
LDLC SERPL CALC-MCNC: 94 MG/DL
LYMPHOCYTES # BLD AUTO: 1.27 K/UL
LYMPHOCYTES NFR BLD AUTO: 34.8 %
MAGNESIUM SERPL-MCNC: 1.7 MG/DL
MAN DIFF?: NORMAL
MCHC RBC-ENTMCNC: 27.2 PG
MCHC RBC-ENTMCNC: 29.9 GM/DL
MCV RBC AUTO: 91.3 FL
MONOCYTES # BLD AUTO: 0.39 K/UL
MONOCYTES NFR BLD AUTO: 10.7 %
NEUTROPHILS # BLD AUTO: 1.81 K/UL
NEUTROPHILS NFR BLD AUTO: 49.6 %
NONHDLC SERPL-MCNC: 109 MG/DL
NT-PROBNP SERPL-MCNC: 63 PG/ML
PHOSPHATE SERPL-MCNC: 2.8 MG/DL
PLATELET # BLD AUTO: 247 K/UL
POTASSIUM SERPL-SCNC: 5 MMOL/L
PROT SERPL-MCNC: 6.8 G/DL
RBC # BLD: 3.89 M/UL
RBC # FLD: 14.4 %
SODIUM SERPL-SCNC: 141 MMOL/L
T4 FREE SERPL-MCNC: 1 NG/DL
TRIGL SERPL-MCNC: 77 MG/DL
TSH SERPL-ACNC: 2.46 UIU/ML
URATE SERPL-MCNC: 4.5 MG/DL
WBC # FLD AUTO: 3.65 K/UL

## 2022-04-17 NOTE — PHYSICAL EXAM
[Normal] : soft, non-tender, non-distended, no masses palpated, no HSM and normal bowel sounds [de-identified] : left leg with non pitting edema

## 2022-04-17 NOTE — REVIEW OF SYSTEMS
[Dyspnea on Exertion] : dyspnea on exertion [Chest Pain] : no chest pain [Shortness Of Breath] : no shortness of breath [Negative] : Musculoskeletal

## 2022-04-17 NOTE — PLAN
[FreeTextEntry1] : ekg normal\par refer back to cardiology for eval with symptoms\par then schedule colon\par check diabetes\par decline high bp med

## 2022-04-17 NOTE — HISTORY OF PRESENT ILLNESS
[FreeTextEntry1] : diabetes and other [de-identified] : diabetes on metformin\par prostate cancer\par  s/p surgery on med\par some exertional sob\par Saw Dr Alvarado in past but not recently\par \par swollen left leg being treated with compression stocking from prostate surgery

## 2022-07-22 ENCOUNTER — NON-APPOINTMENT (OUTPATIENT)
Age: 62
End: 2022-07-22

## 2022-07-22 ENCOUNTER — APPOINTMENT (OUTPATIENT)
Dept: INTERNAL MEDICINE | Facility: CLINIC | Age: 62
End: 2022-07-22

## 2022-09-08 ENCOUNTER — NON-APPOINTMENT (OUTPATIENT)
Age: 62
End: 2022-09-08

## 2022-12-19 NOTE — ED PROVIDER NOTE - NSFOLLOWUPINSTRUCTIONS_ED_ALL_ED_FT
SUBJECTIVE:   Ms. Eddie Bailey is a 79 y.o. female who is here for follow up of routine medical issues. Post-herpetic neuralgia: Pt presents for post-herpetic neuralgia on her back. She states it is an itching, burning, and stabbing pain under her left shoulder blade. Pt reports that the pain usually stays on her left side and it is difficult to identify if it spreads out. She notes that it prevents her from sleeping, but when she does sleep she cannot feel the pain. She slept from 10 pm- 3 am last night. She also reports fatigue. Pt currently takes Lyrica and notes some improvement, but states that she does not like the drowsiness associated with it. She also uses Lidocaine patches. She currently works at home and has multiple stressors outside of her job. Pt states that though she has received both shingles vaccines, the two doses were more than 2-6 months apart. HTN: Pt is compliant in taking Hyzaar 50-12.5 mg. Pt's BP in the office today was 120-78. Patient denies chest pain, RODRIGUEZ/SOB, edema, headache, visual changes, dizziness, palpitations or syncope. T2DM: Pt is compliant in taking 0.5 mL Trulicity and Metforming  mg BID. She reports her glucose to range from 126-150, but notes that it can exceed this. Patient denies fatigue, dizziness, polydipsia, polyuria, polyphagia, pancreatitis, increased sugar consumption, sore/bleeding gums, blurred vision, or cuts that will not heal.    Pt specifically denies changes in vision or hearing, trouble with swallowing or taste, CP, SOB, heartburn or upset stomach, change in bowel habits, problems urinating, unusual joint or muscle pains, numbness or tingling in extremities, or skin lesions of concern.     PREVENTIVE:  Colonoscopy: UTD 3/12/2018 (repeat in 5 years)  Mammogram: UTD 2/15/2022  Dexa: UTD 5/28/20  Pneumonia vaccine: UTD 2020  Shingrix: incomplete  Flu: UTD 9/2022  Eye Exam: UTD  COVID: 3 shots complete    At this time, she is otherwise doing well and has brought no other complaints to my attention today. For a list of the medical issues addressed today, see the assessment and plan below. PMH:   Past Medical History:   Diagnosis Date    Asthma     Diabetes ([address]     Hypertension     Menopause     Sleep apnea     mild case, no cpap, mouth guard     PSH:  has a past surgical history that includes hx colostomy (2017); hx cataract removal (Right); hx cataract removal (Left); hx heent; and hx heent. All: has no allergies on file. MEDS:   Current Outpatient Medications   Medication Sig    pregabalin (LYRICA) 100 mg capsule Take 1 Capsule by mouth two (2) times a day. Max Daily Amount: 200 mg.    methylPREDNISolone (MEDROL DOSEPACK) 4 mg tablet Take as directed on the pack. valACYclovir (VALTREX) 500 mg tablet Take 2 Tablets by mouth two (2) times a day for 7 days. dulaglutide (TRULICITY) 1.5 QH/9.7 mL sub-q pen 0.5 mL by SubCUTAneous route every seven (7) days. amitriptyline (ELAVIL) 25 mg tablet Take 1 Tablet by mouth nightly. lidocaine (LIDODERM) 5 % 1 Patch by TransDERmal route every twenty-four (24) hours. Apply patch to the affected area for 12 hours a day and remove for 12 hours a day. metFORMIN ER (GLUCOPHAGE XR) 500 mg tablet TAKE 2 TABLETS BY MOUTH EVERY DAY    cetirizine (Allergy Relief, cetirizine,) 10 mg tablet TAKE 1 TABLET BY MOUTH EVERY DAY    flash glucose sensor (FreeStyle Jelena 2 Sensor) kit 1 Each by Does Not Apply route every seven (7) days. vit C/E/Zn/coppr/lutein/zeaxan (PRESERVISION AREDS-2 PO) Take  by mouth. flash glucose sensor (FreeStyle Jelena 14 Day Sensor) kit Check blood glucose three times daily. flash glucose scanning reader (FreeStyle Jelena 2 Point Clear) misc Check blood glucose three times daily.     albuterol (PROVENTIL HFA, VENTOLIN HFA, PROAIR HFA) 90 mcg/actuation inhaler INHALE 2 PUFFS BY MOUTH EVERY 4 HOURS AS NEEDED FOR WHEEZE    losartan-hydroCHLOROthiazide (HYZAAR) 50-12.5 mg per tablet TAKE 1 TABLET BY MOUTH EVERY DAY    multivitamin (ONE A DAY) tablet Take 1 Tab by mouth daily. Blood-Glucose Meter monitoring kit Check blood glucose twice daily. glucose blood VI test strips (Accu-Chek Amy Plus test strp) strip Check blood glucose twice daily    lancets misc Check blood glucose twice daily. Cinnamon 500 mg cap Take  by mouth. (Patient not taking: Reported on 12/19/2022)    metFORMIN ER (GLUCOPHAGE XR) 500 mg tablet TAKE 1 TABLET BY MOUTH EVERY DAY WITH DINNER     No current facility-administered medications for this visit. FH: family history includes Heart Disease in her father; Heart Failure in her father; She [place] in her mother; No Known Problems in her sister. SH:  reports that she quit smoking about 47 years ago. Her smoking use included cigarettes. She has a 0.50 pack-year smoking history. She has never used smokeless tobacco. She reports current alcohol use of about 2.0 standard drinks per week. She reports that she does not use drugs.      Review of Systems - History obtained from the patient  General ROS: no fever, chills, fatigue, body aches  Psychological ROS: no change in anxiety, depression, SI/HI  Ophthalmic ROS: no blurred vision, myopia, double vision  ENT ROS: no dysphagia, otalgia, otorrhea, rhinorrhea, post nasal drip  Respiratory ROS: no cough, shortness of breath, or wheezing  Cardiovascular ROS: no chest pain or dyspnea on exertion  Gastrointestinal ROS: no abdominal pain, change in bowel habits, or black or bloody stools  Genito-Urinary ROS: no frequency, urgency, incontinence, dysuria, hematouria  Musculoskeletal ROS: +back pain  Neurological ROS: +back pain  Dermatological ROS: no rash or lesions    OBJECTIVE:   Vitals: Visit Vitals  /78 (BP 1 Location: Left upper arm, BP Patient Position: Sitting, BP Cuff Size: Adult)   Pulse 72   Temp 97.5 °F (36.4 °C) (Temporal)   Resp 17   Ht 5' 2\" (1.575 m)   Wt 161 lb (73 kg)   SpO2 98%   BMI 29.45 kg/m² Gen: Pleasant 79 y.o.  female in NAD. HEENT: RADHA. EOMI. OP moist and pink. Neck: Supple. No LAD. HEART: RRR, No M/G/R.      LUNGS: CTAB No W/R. EXTREMITIES: Warm. No C/C/E.    MUSCULOSKELETAL: Normal ROM, muscle strength 5/5 all groups. NEURO: Alert and oriented x 3. Cranial nerves grossly intact. No focal sensory or motor deficits noted. SKIN: Warm. Dry. No rashes or other lesions noted. ASSESSMENT/ PLAN: Diagnoses and all orders for this visit:    1. Post herpetic neuralgia  -     pregabalin (LYRICA) 100 mg capsule; Take 1 Capsule by mouth two (2) times a day. Max Daily Amount: 200 mg.  -     methylPREDNISolone (MEDROL DOSEPACK) 4 mg tablet; Take as directed on the pack. -     valACYclovir (VALTREX) 500 mg tablet; Take 2 Tablets by mouth two (2) times a day for 7 days. -     CBC WITH AUTOMATED DIFF; Future  -     amitriptyline (ELAVIL) 25 mg tablet; Take 1 Tablet by mouth nightly. 2. Other chronic back pain  -     XR SPINE THORAC 3 V; Future    3. Type 2 diabetes mellitus with hyperglycemia, without long-term current use of insulin (HCC)  -     dulaglutide (TRULICITY) 1.5 XM/9.5 mL sub-q pen; 0.5 mL by SubCUTAneous route every seven (7) days.  -     METABOLIC PANEL, COMPREHENSIVE; Future  -     HEMOGLOBIN A1C WITH EAG; Future    1. Post herpetic neuralgia  I have prescribed her Lyrica 100 mg and a medrol pack. I have also started another course of Valtrex. Recheck CBC. -     pregabalin (LYRICA) 100 mg capsule; Take 1 Capsule by mouth two (2) times a day. Max Daily Amount: 200 mg.  -     methylPREDNISolone (MEDROL DOSEPACK) 4 mg tablet; Take as directed on the pack. -     valACYclovir (VALTREX) 500 mg tablet; Take 2 Tablets by mouth two (2) times a day for 7 days. -     CBC WITH AUTOMATED DIFF; Future        - Elavil 25 mg nightly to reduce pain  2. Other chronic back pain  I have ordered an x-ray for her thoracic spine. -     XR SPINE THORAC 3 V; Future    3.  Type 2 diabetes mellitus with hyperglycemia, without long-term current use of insulin (HCC)  Pt's blood sugar seems to be well controlled. I advised pt to continue current dose of Trulicity, avoid sugars and starches, and to increase exercise when possible. Recheck hemoglobin A1c and CMP. -     dulaglutide (TRULICITY) 1.5 ZS/5.0 mL sub-q pen; 0.5 mL by SubCUTAneous route every seven (7) days.  -     METABOLIC PANEL, COMPREHENSIVE; Future  -     HEMOGLOBIN A1C WITH EAG; Future  Follow-up and Dispositions    Return in about 3 months (around 3/19/2023) for follow up. I have reviewed the patient's medications and risks/side effects/benefits were discussed. Diagnosis(-es) explained to patient and questions answered. Literature provided where appropriate.      Written by Muriel Oleary, as dictated by Gabriele Todd MD. Advance activity as tolerated.  Continue all previously prescribed medications as directed unless otherwise instructed.    Follow up with your primary care physician and urologist/ oncologist in 48-72 hours- bring copies of your results.  Return to the ER for worsening or persistent symptoms, fever, chills worsening swelling, numbness, tingling and/or ANY NEW OR CONCERNING SYMPTOMS.   If you have issues obtaining follow up, please call: [phone] to obtain a doctor or specialist who takes your insurance in your area.  You may call [phone] to make an appointment with the internal medicine clinic.

## 2023-02-03 ENCOUNTER — NON-APPOINTMENT (OUTPATIENT)
Age: 63
End: 2023-02-03

## 2023-02-16 RX ORDER — EMPAGLIFLOZIN 10 MG/1
10 TABLET, FILM COATED ORAL
Qty: 90 | Refills: 3 | Status: DISCONTINUED | COMMUNITY
Start: 2023-02-03 | End: 2023-02-16

## 2023-03-21 ENCOUNTER — APPOINTMENT (OUTPATIENT)
Dept: INTERNAL MEDICINE | Facility: CLINIC | Age: 63
End: 2023-03-21
Payer: COMMERCIAL

## 2023-03-21 VITALS
BODY MASS INDEX: 36.51 KG/M2 | WEIGHT: 255 LBS | SYSTOLIC BLOOD PRESSURE: 135 MMHG | HEART RATE: 79 BPM | HEIGHT: 70 IN | TEMPERATURE: 97.8 F | DIASTOLIC BLOOD PRESSURE: 86 MMHG | OXYGEN SATURATION: 96 %

## 2023-03-21 DIAGNOSIS — R59.1 GENERALIZED ENLARGED LYMPH NODES: ICD-10-CM

## 2023-03-21 PROCEDURE — 99396 PREV VISIT EST AGE 40-64: CPT | Mod: 25

## 2023-03-21 PROCEDURE — 36415 COLL VENOUS BLD VENIPUNCTURE: CPT

## 2023-03-21 RX ORDER — RELUGOLIX 120 MG/1
120 TABLET, FILM COATED ORAL DAILY
Qty: 90 | Refills: 0 | Status: DISCONTINUED | COMMUNITY
Start: 2022-04-15 | End: 2023-03-21

## 2023-03-21 NOTE — HISTORY OF PRESENT ILLNESS
[FreeTextEntry1] : Annual [de-identified] : Annual\par decline vaccine and colon\par \par diabetes last a1c over 8\par on metformin and?\par prostate ca on therapy\par lump back of neck\par

## 2023-03-21 NOTE — HEALTH RISK ASSESSMENT
[Fair] :  ~his/her~ mood as fair [No] : No [Never (0 pts)] : Never (0 points) [No falls in past year] : Patient reported no falls in the past year [0] : 2) Feeling down, depressed, or hopeless: Not at all (0) [PHQ-2 Negative - No further assessment needed] : PHQ-2 Negative - No further assessment needed [None] : None [Alone] : lives alone [High School] : high school [Fully functional (bathing, dressing, toileting, transferring, walking, feeding)] : Fully functional (bathing, dressing, toileting, transferring, walking, feeding) [Fully functional (using the telephone, shopping, preparing meals, housekeeping, doing laundry, using] : Fully functional and needs no help or supervision to perform IADLs (using the telephone, shopping, preparing meals, housekeeping, doing laundry, using transportation, managing medications and managing finances) [Smoke Detector] : smoke detector [Carbon Monoxide Detector] : carbon monoxide detector [Seat Belt] :  uses seat belt [[id number]] : 0 [Change in mental status noted] : No change in mental status noted [Language] : denies difficulty with language [Behavior] : denies difficulty with behavior [Learning/Retaining New Information] : denies difficulty learning/retaining new information [Handling Complex Tasks] : denies difficulty handling complex tasks [Reasoning] : denies difficulty with reasoning [Spatial Ability and Orientation] : denies difficulty with spatial ability and orientation [Reports changes in hearing] : Reports no changes in hearing [Reports changes in vision] : Reports no changes in vision [Reports changes in dental health] : Reports no changes in dental health [Guns at Home] : no guns at home

## 2023-03-21 NOTE — PHYSICAL EXAM
[Normal] : soft, non-tender, non-distended, no masses palpated, no HSM and normal bowel sounds [de-identified] : 2 cm round soft lump back of neck  LN?

## 2023-03-21 NOTE — PLAN
[FreeTextEntry1] : Annual exam\par decline vaccine\par need colon to be schedule\par \par lump neck ct soft tissue neck\par ct abd pelvis f/u prostate ca and ln neck\par check a1c, lipid and cea\par ekg per cardiology Dr Alvarado\par recent cst

## 2023-03-22 LAB
ALBUMIN SERPL ELPH-MCNC: 4.7 G/DL
ALP BLD-CCNC: 100 U/L
ALT SERPL-CCNC: 14 U/L
ANION GAP SERPL CALC-SCNC: 14 MMOL/L
AST SERPL-CCNC: 15 U/L
BASOPHILS # BLD AUTO: 0.05 K/UL
BASOPHILS NFR BLD AUTO: 0.8 %
BILIRUB SERPL-MCNC: 0.8 MG/DL
BUN SERPL-MCNC: 11 MG/DL
CALCIUM SERPL-MCNC: 10.7 MG/DL
CEA SERPL-MCNC: 1.2 NG/ML
CHLORIDE SERPL-SCNC: 99 MMOL/L
CHOLEST SERPL-MCNC: 181 MG/DL
CO2 SERPL-SCNC: 25 MMOL/L
CREAT SERPL-MCNC: 1.01 MG/DL
CREAT SPEC-SCNC: 92 MG/DL
EGFR: 84 ML/MIN/1.73M2
EOSINOPHIL # BLD AUTO: 0.13 K/UL
EOSINOPHIL NFR BLD AUTO: 2.1 %
ESTIMATED AVERAGE GLUCOSE: 180 MG/DL
GLUCOSE SERPL-MCNC: 137 MG/DL
HBA1C MFR BLD HPLC: 7.9 %
HCT VFR BLD CALC: 39.2 %
HDLC SERPL-MCNC: 46 MG/DL
HGB BLD-MCNC: 12.5 G/DL
IMM GRANULOCYTES NFR BLD AUTO: 0.3 %
LDLC SERPL CALC-MCNC: 114 MG/DL
LYMPHOCYTES # BLD AUTO: 1.75 K/UL
LYMPHOCYTES NFR BLD AUTO: 28.5 %
MAN DIFF?: NORMAL
MCHC RBC-ENTMCNC: 29.3 PG
MCHC RBC-ENTMCNC: 31.9 GM/DL
MCV RBC AUTO: 91.8 FL
MICROALBUMIN 24H UR DL<=1MG/L-MCNC: 1.9 MG/DL
MICROALBUMIN/CREAT 24H UR-RTO: 21 MG/G
MONOCYTES # BLD AUTO: 0.47 K/UL
MONOCYTES NFR BLD AUTO: 7.6 %
NEUTROPHILS # BLD AUTO: 3.73 K/UL
NEUTROPHILS NFR BLD AUTO: 60.7 %
NONHDLC SERPL-MCNC: 136 MG/DL
PLATELET # BLD AUTO: 260 K/UL
POTASSIUM SERPL-SCNC: 4.7 MMOL/L
PROT SERPL-MCNC: 7.2 G/DL
RBC # BLD: 4.27 M/UL
RBC # FLD: 13.1 %
SODIUM SERPL-SCNC: 137 MMOL/L
T4 FREE SERPL-MCNC: 1.2 NG/DL
TRIGL SERPL-MCNC: 111 MG/DL
TSH SERPL-ACNC: 1.75 UIU/ML
URATE SERPL-MCNC: 4.6 MG/DL
WBC # FLD AUTO: 6.15 K/UL

## 2023-04-21 RX ORDER — ONDANSETRON 8 MG/1
8 TABLET, ORALLY DISINTEGRATING ORAL
Qty: 5 | Refills: 0 | Status: ACTIVE | COMMUNITY
Start: 2023-04-21 | End: 1900-01-01

## 2023-05-04 ENCOUNTER — APPOINTMENT (OUTPATIENT)
Dept: INTERNAL MEDICINE | Facility: CLINIC | Age: 63
End: 2023-05-04
Payer: COMMERCIAL

## 2023-05-04 ENCOUNTER — TRANSCRIPTION ENCOUNTER (OUTPATIENT)
Age: 63
End: 2023-05-04

## 2023-05-04 PROCEDURE — 45378 DIAGNOSTIC COLONOSCOPY: CPT

## 2023-05-17 RX ORDER — FLASH GLUCOSE SENSOR
KIT MISCELLANEOUS
Qty: 1 | Refills: 0 | Status: ACTIVE | COMMUNITY
Start: 2023-05-17 | End: 1900-01-01

## 2023-05-24 ENCOUNTER — NON-APPOINTMENT (OUTPATIENT)
Age: 63
End: 2023-05-24

## 2023-06-24 ENCOUNTER — INPATIENT (INPATIENT)
Facility: HOSPITAL | Age: 63
LOS: 7 days | Discharge: HOME | End: 2023-07-02
Attending: STUDENT IN AN ORGANIZED HEALTH CARE EDUCATION/TRAINING PROGRAM | Admitting: STUDENT IN AN ORGANIZED HEALTH CARE EDUCATION/TRAINING PROGRAM
Payer: COMMERCIAL

## 2023-06-24 VITALS
TEMPERATURE: 102 F | HEART RATE: 101 BPM | RESPIRATION RATE: 17 BRPM | DIASTOLIC BLOOD PRESSURE: 71 MMHG | SYSTOLIC BLOOD PRESSURE: 122 MMHG | OXYGEN SATURATION: 98 %

## 2023-06-24 DIAGNOSIS — Z90.79 ACQUIRED ABSENCE OF OTHER GENITAL ORGAN(S): Chronic | ICD-10-CM

## 2023-06-24 LAB
A1C WITH ESTIMATED AVERAGE GLUCOSE RESULT: 7.1 % — HIGH (ref 4–5.6)
ALBUMIN SERPL ELPH-MCNC: 4.1 G/DL — SIGNIFICANT CHANGE UP (ref 3.3–5)
ALP SERPL-CCNC: 61 U/L — SIGNIFICANT CHANGE UP (ref 40–120)
ALT FLD-CCNC: 17 U/L — SIGNIFICANT CHANGE UP (ref 4–41)
ANION GAP SERPL CALC-SCNC: 14 MMOL/L — SIGNIFICANT CHANGE UP (ref 7–14)
APPEARANCE UR: CLEAR — SIGNIFICANT CHANGE UP
AST SERPL-CCNC: 25 U/L — SIGNIFICANT CHANGE UP (ref 4–40)
B PERT DNA SPEC QL NAA+PROBE: SIGNIFICANT CHANGE UP
B PERT+PARAPERT DNA PNL SPEC NAA+PROBE: SIGNIFICANT CHANGE UP
BACTERIA # UR AUTO: NEGATIVE — SIGNIFICANT CHANGE UP
BASE EXCESS BLDV CALC-SCNC: 3.6 MMOL/L — HIGH (ref -2–3)
BASOPHILS # BLD AUTO: 0.02 K/UL — SIGNIFICANT CHANGE UP (ref 0–0.2)
BASOPHILS NFR BLD AUTO: 0.2 % — SIGNIFICANT CHANGE UP (ref 0–2)
BILIRUB SERPL-MCNC: 1.2 MG/DL — SIGNIFICANT CHANGE UP (ref 0.2–1.2)
BILIRUB UR-MCNC: NEGATIVE — SIGNIFICANT CHANGE UP
BLOOD GAS VENOUS COMPREHENSIVE RESULT: SIGNIFICANT CHANGE UP
BORDETELLA PARAPERTUSSIS (RAPRVP): SIGNIFICANT CHANGE UP
BUN SERPL-MCNC: 12 MG/DL — SIGNIFICANT CHANGE UP (ref 7–23)
C PNEUM DNA SPEC QL NAA+PROBE: SIGNIFICANT CHANGE UP
CALCIUM SERPL-MCNC: 10 MG/DL — SIGNIFICANT CHANGE UP (ref 8.4–10.5)
CHLORIDE BLDV-SCNC: 97 MMOL/L — SIGNIFICANT CHANGE UP (ref 96–108)
CHLORIDE SERPL-SCNC: 96 MMOL/L — LOW (ref 98–107)
CO2 BLDV-SCNC: 30.5 MMOL/L — HIGH (ref 22–26)
CO2 SERPL-SCNC: 24 MMOL/L — SIGNIFICANT CHANGE UP (ref 22–31)
COLOR SPEC: SIGNIFICANT CHANGE UP
CREAT SERPL-MCNC: 1.25 MG/DL — SIGNIFICANT CHANGE UP (ref 0.5–1.3)
DIFF PNL FLD: ABNORMAL
EGFR: 65 ML/MIN/1.73M2 — SIGNIFICANT CHANGE UP
EOSINOPHIL # BLD AUTO: 0.01 K/UL — SIGNIFICANT CHANGE UP (ref 0–0.5)
EOSINOPHIL NFR BLD AUTO: 0.1 % — SIGNIFICANT CHANGE UP (ref 0–6)
EPI CELLS # UR: 0 /HPF — SIGNIFICANT CHANGE UP (ref 0–5)
ESTIMATED AVERAGE GLUCOSE: 157 — SIGNIFICANT CHANGE UP
FLUAV SUBTYP SPEC NAA+PROBE: SIGNIFICANT CHANGE UP
FLUBV RNA SPEC QL NAA+PROBE: SIGNIFICANT CHANGE UP
GAS PNL BLDV: 131 MMOL/L — LOW (ref 136–145)
GLUCOSE BLDV-MCNC: 116 MG/DL — HIGH (ref 70–99)
GLUCOSE SERPL-MCNC: 117 MG/DL — HIGH (ref 70–99)
GLUCOSE UR QL: NEGATIVE — SIGNIFICANT CHANGE UP
HADV DNA SPEC QL NAA+PROBE: SIGNIFICANT CHANGE UP
HCO3 BLDV-SCNC: 29 MMOL/L — SIGNIFICANT CHANGE UP (ref 22–29)
HCOV 229E RNA SPEC QL NAA+PROBE: SIGNIFICANT CHANGE UP
HCOV HKU1 RNA SPEC QL NAA+PROBE: SIGNIFICANT CHANGE UP
HCOV NL63 RNA SPEC QL NAA+PROBE: SIGNIFICANT CHANGE UP
HCOV OC43 RNA SPEC QL NAA+PROBE: SIGNIFICANT CHANGE UP
HCT VFR BLD CALC: 34.5 % — LOW (ref 39–50)
HCT VFR BLDA CALC: 34 % — LOW (ref 39–51)
HGB BLD CALC-MCNC: 11.2 G/DL — LOW (ref 12.6–17.4)
HGB BLD-MCNC: 10.9 G/DL — LOW (ref 13–17)
HMPV RNA SPEC QL NAA+PROBE: SIGNIFICANT CHANGE UP
HPIV1 RNA SPEC QL NAA+PROBE: SIGNIFICANT CHANGE UP
HPIV2 RNA SPEC QL NAA+PROBE: SIGNIFICANT CHANGE UP
HPIV3 RNA SPEC QL NAA+PROBE: SIGNIFICANT CHANGE UP
HPIV4 RNA SPEC QL NAA+PROBE: SIGNIFICANT CHANGE UP
HYALINE CASTS # UR AUTO: 0 /LPF — SIGNIFICANT CHANGE UP (ref 0–7)
IANC: 6.96 K/UL — SIGNIFICANT CHANGE UP (ref 1.8–7.4)
IMM GRANULOCYTES NFR BLD AUTO: 0.3 % — SIGNIFICANT CHANGE UP (ref 0–0.9)
KETONES UR-MCNC: NEGATIVE — SIGNIFICANT CHANGE UP
LACTATE BLDV-MCNC: 1.1 MMOL/L — SIGNIFICANT CHANGE UP (ref 0.5–2)
LEUKOCYTE ESTERASE UR-ACNC: NEGATIVE — SIGNIFICANT CHANGE UP
LIDOCAIN IGE QN: 27 U/L — SIGNIFICANT CHANGE UP (ref 7–60)
LYMPHOCYTES # BLD AUTO: 1.41 K/UL — SIGNIFICANT CHANGE UP (ref 1–3.3)
LYMPHOCYTES # BLD AUTO: 15.3 % — SIGNIFICANT CHANGE UP (ref 13–44)
M PNEUMO DNA SPEC QL NAA+PROBE: SIGNIFICANT CHANGE UP
MAGNESIUM SERPL-MCNC: 1.6 MG/DL — SIGNIFICANT CHANGE UP (ref 1.6–2.6)
MCHC RBC-ENTMCNC: 28.7 PG — SIGNIFICANT CHANGE UP (ref 27–34)
MCHC RBC-ENTMCNC: 31.6 GM/DL — LOW (ref 32–36)
MCV RBC AUTO: 90.8 FL — SIGNIFICANT CHANGE UP (ref 80–100)
MONOCYTES # BLD AUTO: 0.78 K/UL — SIGNIFICANT CHANGE UP (ref 0–0.9)
MONOCYTES NFR BLD AUTO: 8.5 % — SIGNIFICANT CHANGE UP (ref 2–14)
NEUTROPHILS # BLD AUTO: 6.96 K/UL — SIGNIFICANT CHANGE UP (ref 1.8–7.4)
NEUTROPHILS NFR BLD AUTO: 75.6 % — SIGNIFICANT CHANGE UP (ref 43–77)
NITRITE UR-MCNC: NEGATIVE — SIGNIFICANT CHANGE UP
NRBC # BLD: 0 /100 WBCS — SIGNIFICANT CHANGE UP (ref 0–0)
NRBC # FLD: 0 K/UL — SIGNIFICANT CHANGE UP (ref 0–0)
PCO2 BLDV: 47 MMHG — SIGNIFICANT CHANGE UP (ref 42–55)
PH BLDV: 7.4 — SIGNIFICANT CHANGE UP (ref 7.32–7.43)
PH UR: 6 — SIGNIFICANT CHANGE UP (ref 5–8)
PLATELET # BLD AUTO: 173 K/UL — SIGNIFICANT CHANGE UP (ref 150–400)
PO2 BLDV: 25 MMHG — SIGNIFICANT CHANGE UP (ref 25–45)
POTASSIUM BLDV-SCNC: 4 MMOL/L — SIGNIFICANT CHANGE UP (ref 3.5–5.1)
POTASSIUM SERPL-MCNC: 4 MMOL/L — SIGNIFICANT CHANGE UP (ref 3.5–5.3)
POTASSIUM SERPL-SCNC: 4 MMOL/L — SIGNIFICANT CHANGE UP (ref 3.5–5.3)
PROT SERPL-MCNC: 7.2 G/DL — SIGNIFICANT CHANGE UP (ref 6–8.3)
PROT UR-MCNC: ABNORMAL
RAPID RVP RESULT: SIGNIFICANT CHANGE UP
RBC # BLD: 3.8 M/UL — LOW (ref 4.2–5.8)
RBC # FLD: 13.6 % — SIGNIFICANT CHANGE UP (ref 10.3–14.5)
RBC CASTS # UR COMP ASSIST: 6 /HPF — HIGH (ref 0–4)
RSV RNA SPEC QL NAA+PROBE: SIGNIFICANT CHANGE UP
RV+EV RNA SPEC QL NAA+PROBE: SIGNIFICANT CHANGE UP
SAO2 % BLDV: 31.4 % — LOW (ref 67–88)
SARS-COV-2 RNA SPEC QL NAA+PROBE: SIGNIFICANT CHANGE UP
SODIUM SERPL-SCNC: 134 MMOL/L — LOW (ref 135–145)
SP GR SPEC: 1.02 — SIGNIFICANT CHANGE UP (ref 1.01–1.05)
UROBILINOGEN FLD QL: SIGNIFICANT CHANGE UP
WBC # BLD: 9.21 K/UL — SIGNIFICANT CHANGE UP (ref 3.8–10.5)
WBC # FLD AUTO: 9.21 K/UL — SIGNIFICANT CHANGE UP (ref 3.8–10.5)
WBC UR QL: 1 /HPF — SIGNIFICANT CHANGE UP (ref 0–5)

## 2023-06-24 PROCEDURE — 99285 EMERGENCY DEPT VISIT HI MDM: CPT

## 2023-06-24 PROCEDURE — 71046 X-RAY EXAM CHEST 2 VIEWS: CPT | Mod: 26

## 2023-06-24 RX ORDER — ACETAMINOPHEN 500 MG
975 TABLET ORAL ONCE
Refills: 0 | Status: COMPLETED | OUTPATIENT
Start: 2023-06-24 | End: 2023-06-24

## 2023-06-24 RX ORDER — SODIUM CHLORIDE 9 MG/ML
1000 INJECTION INTRAMUSCULAR; INTRAVENOUS; SUBCUTANEOUS ONCE
Refills: 0 | Status: COMPLETED | OUTPATIENT
Start: 2023-06-24 | End: 2023-06-24

## 2023-06-24 RX ORDER — METOCLOPRAMIDE HCL 10 MG
10 TABLET ORAL ONCE
Refills: 0 | Status: COMPLETED | OUTPATIENT
Start: 2023-06-24 | End: 2023-06-24

## 2023-06-24 RX ADMIN — Medication 975 MILLIGRAM(S): at 23:36

## 2023-06-24 RX ADMIN — SODIUM CHLORIDE 1000 MILLILITER(S): 9 INJECTION INTRAMUSCULAR; INTRAVENOUS; SUBCUTANEOUS at 23:36

## 2023-06-24 RX ADMIN — Medication 10 MILLIGRAM(S): at 21:38

## 2023-06-24 RX ADMIN — Medication 975 MILLIGRAM(S): at 21:38

## 2023-06-24 RX ADMIN — SODIUM CHLORIDE 1000 MILLILITER(S): 9 INJECTION INTRAMUSCULAR; INTRAVENOUS; SUBCUTANEOUS at 21:38

## 2023-06-24 NOTE — ED PROVIDER NOTE - PHYSICAL EXAMINATION
Vitals: I have reviewed the patients vital signs  General: Well dressed, well appearing, no acute distress  HEENT: Atraumatic, normocephalic, airway patent  Eyes: EOMI, tracking appropriately  Neck: no tracheal deviation, no JVD  Chest/Lungs: symmetric chest rise, speaking in complete sentences, no WOB, clear to auscultation bilaterally   Heart: skin and extremities well perfused, mildly tachycardic   Abdomen: soft, nontender and nondistended   Neuro: A+Ox3, ambulating without difficulty, CN grossly intact  MSK: strength at baseline in all extremities, no muscle wasting or atrophy  Skin: no cyanosis, no jaundice, no new emergent lesions

## 2023-06-24 NOTE — ED PROVIDER NOTE - CLINICAL SUMMARY MEDICAL DECISION MAKING FREE TEXT BOX
Patient is a 62 year-old-male with history of DM and prostate Ca s/p prostatectomy and radiation presents with fever and generalized weakness. Patient is febrile here with HR~100. Likely viral syndrome. However given fever, will obtain sepsis workup. Will defer antibiotics at this point. Will give fluids, tylenol and reglan.

## 2023-06-24 NOTE — ED PROVIDER NOTE - PATIENT PORTAL LINK FT
You can access the FollowMyHealth Patient Portal offered by [facility] by registering at the following website: [email or web address]. By joining [facility]’s FollowMyHealth portal, you will also be able to view your health information using other applications (apps) compatible with our system.

## 2023-06-24 NOTE — ED ADULT TRIAGE NOTE - CHIEF COMPLAINT QUOTE
Patient states he feels generalized weakness and unbalanced since yesterday, febrile 101.6.  Ambulatory, no neuro deficits noted, no facial droop, bilateral  strength. History of DM, prostate CA not on chemo.

## 2023-06-24 NOTE — ED PROVIDER NOTE - OBJECTIVE STATEMENT
Woody Dorado,    This is to inform you regarding your test result.    HbA1c which is average glucose during last 3 months is 6.2%.  It remains stable.  CBC result which includes white count Hemoglobin and  Platelet Counts is normal.   Ferritin which is iron stores in the body is normal.  ALT which is liver test is slightly elevated.  Kidney function is stable.          Sincerely,      Dr.Nasima Thom MD,FACP      
Patient is a 62 year-old-male with history of DM and prostate Ca s/p prostatectomy and radiation presents with fever and generalized weakness. Patient states that he has been feeling weak and having low energy since yesterday, went to work today but was feeling too weak and thus presented here. Reports intermittent coughing. Denies chest pain, shortness of breath, abdominal pain, nausea, vomiting, dysuria, bloody/black stools. Reports that he always has urinary frequency after his prostate surgery.

## 2023-06-24 NOTE — ED PROVIDER NOTE - ATTENDING CONTRIBUTION TO CARE
Patient is a 62-year-old male with a history of diabetes mellitus, prostate cancer status post prostatectomy and RT  here for weakness and fever.  Patient states he felt unwell yesterday with low energy.  Today he developed a fever.  He has a mild cough.  He denies any nausea, vomiting, sore throat, chest pain, shortness of breath, abdominal pain.  He denies any urinary symptoms such as dysuria.  She denies any diarrhea.  He denies any bloody or black stools. No known sick contacts. No family members who are ill. He reports he is vaccinated for COVID.     VS noted  Gen. no acute distress, Non toxic   HEENT: EOMI, mmm  Lungs: CTAB/L no C/ W /R   CVS: RRR   Abd; Soft non tender, non distended, no CVA tenderness bilaterally  Ext: no edema  Skin: no rash  Neuro AAOx3 non focal clear speech  a/p: fever, weakness - symptoms started yesterday. Exam is non-focal but patient is febrile to 103. Plan for sepsis labs, RVP. Will give tylenol, fluids and reassess. Differential includes viral syndrome, occult infection.   - Navi DEAL

## 2023-06-24 NOTE — ED PROVIDER NOTE - PROGRESS NOTE DETAILS
Sonny PGY2   Bloodwork unremarkable - no leukocytosis, electrolyte/Cr normal. UA unremarkable. Discussed with patient and provided strict return precautions. CXR clear lungs. Sonny PGY2  RVP negative. Patient is still febrile. Discussed with hospitalist - will admit for furthermanagement.

## 2023-06-24 NOTE — ED PROVIDER NOTE - NSFOLLOWUPINSTRUCTIONS_ED_ALL_ED_FT
You were seen in the emergency department for fever and genearlized weakness.   Your workup in the emergency department includes bloodwork, urine study, chest xray.   The presumed diagnosis is viral syndrome.   You can find the results of all the tests in this discharge packet.   Please follow up with your primary care doctor within 48 hours for continuation of care.     Return to the emergency department if you experience any new/concerning/worsening symptoms such as but not limited to: fever (>100.3F), intractable nausea, vomiting, chest pain, shortness of breath, abdominal pain, burning with urination.     For pain, you may take:  1) Acetaminophen (Tylenol): 500mg every 6 hours as needed for pain   2) Ibuprofen (Advil/Motrin): 600mg every 6 hours as needed for pain

## 2023-06-25 DIAGNOSIS — A41.9 SEPSIS, UNSPECIFIED ORGANISM: ICD-10-CM

## 2023-06-25 DIAGNOSIS — E11.9 TYPE 2 DIABETES MELLITUS WITHOUT COMPLICATIONS: ICD-10-CM

## 2023-06-25 DIAGNOSIS — R05.9 COUGH, UNSPECIFIED: ICD-10-CM

## 2023-06-25 DIAGNOSIS — D64.9 ANEMIA, UNSPECIFIED: ICD-10-CM

## 2023-06-25 DIAGNOSIS — Z29.9 ENCOUNTER FOR PROPHYLACTIC MEASURES, UNSPECIFIED: ICD-10-CM

## 2023-06-25 DIAGNOSIS — C61 MALIGNANT NEOPLASM OF PROSTATE: ICD-10-CM

## 2023-06-25 DIAGNOSIS — R53.1 WEAKNESS: ICD-10-CM

## 2023-06-25 DIAGNOSIS — R65.10 SYSTEMIC INFLAMMATORY RESPONSE SYNDROME (SIRS) OF NON-INFECTIOUS ORIGIN WITHOUT ACUTE ORGAN DYSFUNCTION: ICD-10-CM

## 2023-06-25 LAB
GLUCOSE BLDC GLUCOMTR-MCNC: 106 MG/DL — HIGH (ref 70–99)
GLUCOSE BLDC GLUCOMTR-MCNC: 123 MG/DL — HIGH (ref 70–99)
GLUCOSE BLDC GLUCOMTR-MCNC: 126 MG/DL — HIGH (ref 70–99)
GLUCOSE BLDC GLUCOMTR-MCNC: 144 MG/DL — HIGH (ref 70–99)
GLUCOSE BLDC GLUCOMTR-MCNC: 151 MG/DL — HIGH (ref 70–99)
TRANSFERRIN SERPL-MCNC: 216 MG/DL — SIGNIFICANT CHANGE UP (ref 200–360)

## 2023-06-25 PROCEDURE — 99222 1ST HOSP IP/OBS MODERATE 55: CPT | Mod: GC

## 2023-06-25 RX ORDER — PIPERACILLIN AND TAZOBACTAM 4; .5 G/20ML; G/20ML
3.38 INJECTION, POWDER, LYOPHILIZED, FOR SOLUTION INTRAVENOUS EVERY 8 HOURS
Refills: 0 | Status: DISCONTINUED | OUTPATIENT
Start: 2023-06-25 | End: 2023-06-28

## 2023-06-25 RX ORDER — DEXTROSE 50 % IN WATER 50 %
15 SYRINGE (ML) INTRAVENOUS ONCE
Refills: 0 | Status: DISCONTINUED | OUTPATIENT
Start: 2023-06-25 | End: 2023-07-02

## 2023-06-25 RX ORDER — DEXTROSE 50 % IN WATER 50 %
12.5 SYRINGE (ML) INTRAVENOUS ONCE
Refills: 0 | Status: DISCONTINUED | OUTPATIENT
Start: 2023-06-25 | End: 2023-07-02

## 2023-06-25 RX ORDER — DEXTROSE 50 % IN WATER 50 %
25 SYRINGE (ML) INTRAVENOUS ONCE
Refills: 0 | Status: DISCONTINUED | OUTPATIENT
Start: 2023-06-25 | End: 2023-07-02

## 2023-06-25 RX ORDER — METFORMIN HYDROCHLORIDE 850 MG/1
1 TABLET ORAL
Refills: 0 | DISCHARGE

## 2023-06-25 RX ORDER — IBUPROFEN 200 MG
600 TABLET ORAL ONCE
Refills: 0 | Status: COMPLETED | OUTPATIENT
Start: 2023-06-25 | End: 2023-06-25

## 2023-06-25 RX ORDER — PIPERACILLIN AND TAZOBACTAM 4; .5 G/20ML; G/20ML
3.38 INJECTION, POWDER, LYOPHILIZED, FOR SOLUTION INTRAVENOUS ONCE
Refills: 0 | Status: COMPLETED | OUTPATIENT
Start: 2023-06-25 | End: 2023-06-25

## 2023-06-25 RX ORDER — INSULIN LISPRO 100/ML
VIAL (ML) SUBCUTANEOUS
Refills: 0 | Status: DISCONTINUED | OUTPATIENT
Start: 2023-06-25 | End: 2023-07-02

## 2023-06-25 RX ORDER — INSULIN LISPRO 100/ML
VIAL (ML) SUBCUTANEOUS AT BEDTIME
Refills: 0 | Status: DISCONTINUED | OUTPATIENT
Start: 2023-06-25 | End: 2023-07-02

## 2023-06-25 RX ORDER — SEMAGLUTIDE 0.68 MG/ML
0.5 INJECTION, SOLUTION SUBCUTANEOUS
Refills: 0 | DISCHARGE

## 2023-06-25 RX ORDER — ACETAMINOPHEN 500 MG
650 TABLET ORAL EVERY 6 HOURS
Refills: 0 | Status: DISCONTINUED | OUTPATIENT
Start: 2023-06-25 | End: 2023-07-02

## 2023-06-25 RX ORDER — SODIUM CHLORIDE 9 MG/ML
1000 INJECTION, SOLUTION INTRAVENOUS
Refills: 0 | Status: DISCONTINUED | OUTPATIENT
Start: 2023-06-25 | End: 2023-07-02

## 2023-06-25 RX ORDER — ENOXAPARIN SODIUM 100 MG/ML
40 INJECTION SUBCUTANEOUS EVERY 24 HOURS
Refills: 0 | Status: DISCONTINUED | OUTPATIENT
Start: 2023-06-25 | End: 2023-07-02

## 2023-06-25 RX ORDER — GLUCAGON INJECTION, SOLUTION 0.5 MG/.1ML
1 INJECTION, SOLUTION SUBCUTANEOUS ONCE
Refills: 0 | Status: DISCONTINUED | OUTPATIENT
Start: 2023-06-25 | End: 2023-07-02

## 2023-06-25 RX ADMIN — PIPERACILLIN AND TAZOBACTAM 25 GRAM(S): 4; .5 INJECTION, POWDER, LYOPHILIZED, FOR SOLUTION INTRAVENOUS at 17:58

## 2023-06-25 RX ADMIN — Medication 650 MILLIGRAM(S): at 22:45

## 2023-06-25 RX ADMIN — PIPERACILLIN AND TAZOBACTAM 25 GRAM(S): 4; .5 INJECTION, POWDER, LYOPHILIZED, FOR SOLUTION INTRAVENOUS at 09:48

## 2023-06-25 RX ADMIN — SODIUM CHLORIDE 1000 MILLILITER(S): 9 INJECTION INTRAMUSCULAR; INTRAVENOUS; SUBCUTANEOUS at 03:30

## 2023-06-25 RX ADMIN — Medication 200 MILLIGRAM(S): at 09:52

## 2023-06-25 RX ADMIN — PIPERACILLIN AND TAZOBACTAM 200 GRAM(S): 4; .5 INJECTION, POWDER, LYOPHILIZED, FOR SOLUTION INTRAVENOUS at 03:54

## 2023-06-25 RX ADMIN — Medication 600 MILLIGRAM(S): at 03:54

## 2023-06-25 RX ADMIN — Medication 1: at 12:49

## 2023-06-25 RX ADMIN — Medication 650 MILLIGRAM(S): at 21:45

## 2023-06-25 NOTE — H&P ADULT - ASSESSMENT
62 year old male, with history of diabetes and prostate cancer s/p prostatectomy (2020), complaining of lethargy that began 2 days ago (6/23). He was found to be tachycardic and febrile in the ED, although workup showed unremarkable labs, negative UA, negative RVP, and normal CXR, and admitted for sepsis.

## 2023-06-25 NOTE — ED ADULT NURSE NOTE - NSFALLHARMRISKINTERV_ED_ALL_ED

## 2023-06-25 NOTE — H&P ADULT - HISTORY OF PRESENT ILLNESS
CC: 62 year old male with pmhx of diabetes and prostate cancer s/p radiation and prostatectomy (2020) came to the hospital complaining of lethargy since 6/23.    HPI: 62 year old male complaining of lethargy since 6/23. He cannot identify any inciting factors and has not felt this lethargic before. He did not try any medications to improve his lethargy. He also experienced chills and subjective fevers during this time period. He denies recent changes in his weight, night sweats, shortness of breath, and changes in bowel and bladder habits.     In the ED, patient was given tylenol, metoclopramide, and fluids. He was also found to be tachycardic and febrile (Tmax 103F) and admitted for sepsis.  CC: 62 year old male with pmhx of diabetes and prostate cancer s/p radiation and prostatectomy (2020) came to the hospital complaining of lethargy since 6/23.    HPI: 62 year old male complaining of lethargy since 6/23. He cannot identify any inciting factors and has not felt this lethargic before. He did not try any medications to improve his lethargy. He also experienced chills, cough, and subjective fevers during this time period. He denies recent changes in his weight, night sweats, shortness of breath, and changes in bowel and bladder habits.     In the ED, patient was given tylenol, metoclopramide, and fluids. He was also found to be tachycardic and febrile (Tmax 103F) and admitted for sepsis.

## 2023-06-25 NOTE — PATIENT PROFILE ADULT - FALL HARM RISK - UNIVERSAL INTERVENTIONS
Bed in lowest position, wheels locked, appropriate side rails in place/Call bell, personal items and telephone in reach/Instruct patient to call for assistance before getting out of bed or chair/Non-slip footwear when patient is out of bed/[place] to call system/Physically safe environment - no spills, clutter or unnecessary equipment/Purposeful Proactive Rounding/Room/bathroom lighting operational, light cord in reach

## 2023-06-25 NOTE — PATIENT PROFILE ADULT - FUNCTIONAL ASSESSMENT - BASIC MOBILITY 6.
4-calculated by average/Not able to assess (calculate score using [facility] averaging method)

## 2023-06-25 NOTE — ED ADULT NURSE NOTE - ED STAT RN HANDOFF DETAILS
Verified with patient that he does not use a cpap machine. Verbal report was given to MANUELA Wagner. Opportunity was provided to answer all questions.

## 2023-06-25 NOTE — H&P ADULT - PROBLEM SELECTOR PLAN 3
-hx of prostate cancer  -s/p radiation therapy and prostatectomy in 2020  -in remission as per patient

## 2023-06-25 NOTE — H&P ADULT - NSHPREVIEWOFSYSTEMS_GEN_ALL_CORE
Review of Systems:  Constitutional: + Fever No weight loss, good appetite/po intake  Head: No headache   Eyes: No blurry vision, No diplopia  Neuro: No tremors, No muscle weakness   Cardiovascular: No chest pain, No palpitations  Respiratory: +Cough. No SOB,   GI: No nausea, No vomiting, No diarrhea  : No dysuria, No hematuria  Skin: No rash  MSK: No joint pain   Psych: No depression  Heme: No abnormal bruising, no abnormal bleeding

## 2023-06-25 NOTE — H&P ADULT - NSHPLABSRESULTS_GEN_ALL_CORE
10.9   9.21  )-----------( 173      ( 24 Jun 2023 21:58 )             34.5       06-24    134<L>  |  96<L>  |  12  ----------------------------<  117<H>  4.0   |  24  |  1.25    Ca    10.0      24 Jun 2023 21:58  Mg     1.60     06-24    TPro  7.2  /  Alb  4.1  /  TBili  1.2  /  DBili  x   /  AST  25  /  ALT  17  /  AlkPhos  61  06-24           EXAM:  XR CHEST PA LAT 2V   ORDERED BY: BELIA GUILLERMO     PROCEDURE DATE:  06/24/2023    ******PRELIMINARY REPORT******      ******PRELIMINARY REPORT******           INTERPRETATION:  EXAMINATION: XR CHEST PA AND LATERAL    CLINICAL INDICATION: Sepsis    TECHNIQUE: 2 views; Frontal and lateral views of the chest were obtained.    COMPARISON: None.    FINDINGS:  The heart is normal in size.  The lungs are clear.  There is no pneumothorax or pleural effusion.    IMPRESSION:  Clear lungs.

## 2023-06-25 NOTE — ED ADULT NURSE REASSESSMENT NOTE - NS ED NURSE REASSESS COMMENT FT1
Break RN note- Patient resting quietly in bed, breathing even and nonlabored. No acute distress. Fluids running as ordered. Food provided per Dr. Dennis. Safety maintained. Patient stable upon exiting the room.

## 2023-06-25 NOTE — H&P ADULT - ATTENDING COMMENTS
Pt seen and examined, chart and labs reviewed, care discussed with Tricia Briones and [place].  Briefly a 62M with DM2 on Metformin and Ozempic, prostate CA s/p RT and prostatectomy in 2020 presents with lethargy, dry cough, fevers/chills since Friday.  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  Currently appears well, tolerating diet and nontoxic appearing.     #SIRS, unclear source (Tm 103, elevated HR>90)  - Agree with empiric Zosyn for now  - F/U BCx and Ucx, lactate WNL  - Hold off on further imaging at this time    Agree with remainder of Dr. Briones's note as above

## 2023-06-25 NOTE — H&P ADULT - NSHPPHYSICALEXAM_GEN_ALL_CORE
ICU Vital Signs Last 24 Hrs  T(C): 37.9 (25 Jun 2023 04:25), Max: 39.6 (24 Jun 2023 21:23)  T(F): 100.2 (25 Jun 2023 04:25), Max: 103.2 (24 Jun 2023 21:23)  HR: 86 (25 Jun 2023 04:25) (84 - 101)  BP: 113/70 (25 Jun 2023 04:25) (113/70 - 127/66)  BP(mean): --  ABP: --  ABP(mean): --  RR: 17 (25 Jun 2023 04:25) (17 - 19)  SpO2: 99% (25 Jun 2023 04:25) (97% - 99%)    O2 Parameters below as of 25 Jun 2023 04:25  Patient On (Oxygen Delivery Method): room air        Constitutional: NAD  HEENT: NCAT, no conjunctival injection, PERRL, EOMI, moist oral mucosa, no oropharyngeal exudates  Neck: no JVD, supple, no LAD, no thyromegaly  Chest: normal WOB at rest, CTAB  Heart: RRR, physiologic S1 and S2, no murmurs, no rubs, no gallops, 2+ radial pulses  Abd: nondistended, normoactive bowel sounds, soft, nontender, no rebound, no involuntary guarding  Extremities: no clubbing, warm hands and feet, no edema  Neuro: alert, A&Ox3, no focal deficits  MSK: spontaneous movement of all 4 extremities, full and equal strength, no joint swelling  Psych: appropriate mood and affect  Skin: no rashes ICU Vital Signs Last 24 Hrs  T(C): 37.9 (25 Jun 2023 04:25), Max: 39.6 (24 Jun 2023 21:23)  T(F): 100.2 (25 Jun 2023 04:25), Max: 103.2 (24 Jun 2023 21:23)  HR: 86 (25 Jun 2023 04:25) (84 - 101)  BP: 113/70 (25 Jun 2023 04:25) (113/70 - 127/66)  BP(mean): --  ABP: --  ABP(mean): --  RR: 17 (25 Jun 2023 04:25) (17 - 19)  SpO2: 99% (25 Jun 2023 04:25) (97% - 99%)    O2 Parameters below as of 25 Jun 2023 04:25  Patient On (Oxygen Delivery Method): room air        Constitutional: NAD  HEENT: NCAT, no conjunctival injection, PERRL, EOMI, moist oral mucosa, no oropharyngeal exudates  Neck: no JVD, supple, no LAD, no thyromegaly  Chest: normal WOB at rest, CTAB  Heart: RRR, physiologic S1 and S2, no murmurs, no rubs, no gallops, 2+ radial pulses  Abd: nondistended, normoactive bowel sounds, soft, nontender, no rebound, no involuntary guarding  Extremities: 1+ edema in LLE  Neuro: alert, A&Ox3, no focal deficits  MSK: spontaneous movement of all 4 extremities, full and equal strength, no joint swelling  Psych: appropriate mood and affect  Skin: no rashes

## 2023-06-25 NOTE — H&P ADULT - PROBLEM SELECTOR PLAN 1
-meets SIRS criteria with tachycardia and Tmax 103F  -received zosyn in the ED and on zosyn for empiric coverage   -follow up on cultures

## 2023-06-26 ENCOUNTER — NON-APPOINTMENT (OUTPATIENT)
Age: 63
End: 2023-06-26

## 2023-06-26 LAB
ANION GAP SERPL CALC-SCNC: 13 MMOL/L — SIGNIFICANT CHANGE UP (ref 7–14)
BUN SERPL-MCNC: 12 MG/DL — SIGNIFICANT CHANGE UP (ref 7–23)
CALCIUM SERPL-MCNC: 9 MG/DL — SIGNIFICANT CHANGE UP (ref 8.4–10.5)
CHLORIDE SERPL-SCNC: 98 MMOL/L — SIGNIFICANT CHANGE UP (ref 98–107)
CO2 SERPL-SCNC: 22 MMOL/L — SIGNIFICANT CHANGE UP (ref 22–31)
CREAT SERPL-MCNC: 1.23 MG/DL — SIGNIFICANT CHANGE UP (ref 0.5–1.3)
CULTURE RESULTS: NO GROWTH — SIGNIFICANT CHANGE UP
EGFR: 66 ML/MIN/1.73M2 — SIGNIFICANT CHANGE UP
FERRITIN SERPL-MCNC: 236 NG/ML — SIGNIFICANT CHANGE UP (ref 30–400)
GLUCOSE BLDC GLUCOMTR-MCNC: 114 MG/DL — HIGH (ref 70–99)
GLUCOSE BLDC GLUCOMTR-MCNC: 135 MG/DL — HIGH (ref 70–99)
GLUCOSE BLDC GLUCOMTR-MCNC: 138 MG/DL — HIGH (ref 70–99)
GLUCOSE BLDC GLUCOMTR-MCNC: 183 MG/DL — HIGH (ref 70–99)
GLUCOSE SERPL-MCNC: 122 MG/DL — HIGH (ref 70–99)
HCT VFR BLD CALC: 33.2 % — LOW (ref 39–50)
HCV AB S/CO SERPL IA: 0.08 S/CO — SIGNIFICANT CHANGE UP (ref 0–0.99)
HCV AB SERPL-IMP: SIGNIFICANT CHANGE UP
HGB BLD-MCNC: 10.6 G/DL — LOW (ref 13–17)
IRON SATN MFR SERPL: 12 UG/DL — LOW (ref 45–165)
IRON SATN MFR SERPL: 5 % — LOW (ref 14–50)
LEGIONELLA AG UR QL: NEGATIVE — SIGNIFICANT CHANGE UP
MAGNESIUM SERPL-MCNC: 1.8 MG/DL — SIGNIFICANT CHANGE UP (ref 1.6–2.6)
MCHC RBC-ENTMCNC: 28.7 PG — SIGNIFICANT CHANGE UP (ref 27–34)
MCHC RBC-ENTMCNC: 31.9 GM/DL — LOW (ref 32–36)
MCV RBC AUTO: 90 FL — SIGNIFICANT CHANGE UP (ref 80–100)
MRSA PCR RESULT.: SIGNIFICANT CHANGE UP
NRBC # BLD: 0 /100 WBCS — SIGNIFICANT CHANGE UP (ref 0–0)
NRBC # FLD: 0 K/UL — SIGNIFICANT CHANGE UP (ref 0–0)
PHOSPHATE SERPL-MCNC: 2.1 MG/DL — LOW (ref 2.5–4.5)
PLATELET # BLD AUTO: 160 K/UL — SIGNIFICANT CHANGE UP (ref 150–400)
POTASSIUM SERPL-MCNC: 4 MMOL/L — SIGNIFICANT CHANGE UP (ref 3.5–5.3)
POTASSIUM SERPL-SCNC: 4 MMOL/L — SIGNIFICANT CHANGE UP (ref 3.5–5.3)
PROCALCITONIN SERPL-MCNC: 0.8 NG/ML — HIGH (ref 0.02–0.1)
RBC # BLD: 3.69 M/UL — LOW (ref 4.2–5.8)
RBC # FLD: 13.3 % — SIGNIFICANT CHANGE UP (ref 10.3–14.5)
S AUREUS DNA NOSE QL NAA+PROBE: SIGNIFICANT CHANGE UP
SODIUM SERPL-SCNC: 133 MMOL/L — LOW (ref 135–145)
SPECIMEN SOURCE: SIGNIFICANT CHANGE UP
TIBC SERPL-MCNC: 243 UG/DL — SIGNIFICANT CHANGE UP (ref 220–430)
UIBC SERPL-MCNC: 231 UG/DL — SIGNIFICANT CHANGE UP (ref 110–370)
WBC # BLD: 8.49 K/UL — SIGNIFICANT CHANGE UP (ref 3.8–10.5)
WBC # FLD AUTO: 8.49 K/UL — SIGNIFICANT CHANGE UP (ref 3.8–10.5)

## 2023-06-26 PROCEDURE — 99233 SBSQ HOSP IP/OBS HIGH 50: CPT

## 2023-06-26 RX ORDER — SODIUM CHLORIDE 9 MG/ML
1000 INJECTION, SOLUTION INTRAVENOUS ONCE
Refills: 0 | Status: COMPLETED | OUTPATIENT
Start: 2023-06-26 | End: 2023-06-26

## 2023-06-26 RX ORDER — ACETAMINOPHEN 500 MG
1000 TABLET ORAL ONCE
Refills: 0 | Status: COMPLETED | OUTPATIENT
Start: 2023-06-26 | End: 2023-06-26

## 2023-06-26 RX ORDER — VANCOMYCIN HCL 1 G
1750 VIAL (EA) INTRAVENOUS EVERY 12 HOURS
Refills: 0 | Status: DISCONTINUED | OUTPATIENT
Start: 2023-06-26 | End: 2023-06-26

## 2023-06-26 RX ORDER — AZITHROMYCIN 500 MG/1
TABLET, FILM COATED ORAL
Refills: 0 | Status: DISCONTINUED | OUTPATIENT
Start: 2023-06-26 | End: 2023-06-26

## 2023-06-26 RX ORDER — ACETAMINOPHEN 500 MG
975 TABLET ORAL ONCE
Refills: 0 | Status: COMPLETED | OUTPATIENT
Start: 2023-06-26 | End: 2023-06-26

## 2023-06-26 RX ORDER — VANCOMYCIN HCL 1 G
1500 VIAL (EA) INTRAVENOUS EVERY 12 HOURS
Refills: 0 | Status: DISCONTINUED | OUTPATIENT
Start: 2023-06-26 | End: 2023-06-26

## 2023-06-26 RX ORDER — VANCOMYCIN HCL 1 G
1000 VIAL (EA) INTRAVENOUS ONCE
Refills: 0 | Status: COMPLETED | OUTPATIENT
Start: 2023-06-26 | End: 2023-06-26

## 2023-06-26 RX ORDER — AZITHROMYCIN 500 MG/1
500 TABLET, FILM COATED ORAL ONCE
Refills: 0 | Status: DISCONTINUED | OUTPATIENT
Start: 2023-06-26 | End: 2023-06-26

## 2023-06-26 RX ADMIN — PIPERACILLIN AND TAZOBACTAM 25 GRAM(S): 4; .5 INJECTION, POWDER, LYOPHILIZED, FOR SOLUTION INTRAVENOUS at 18:38

## 2023-06-26 RX ADMIN — Medication 63.75 MILLIMOLE(S): at 09:24

## 2023-06-26 RX ADMIN — Medication 975 MILLIGRAM(S): at 01:07

## 2023-06-26 RX ADMIN — SODIUM CHLORIDE 1000 MILLILITER(S): 9 INJECTION, SOLUTION INTRAVENOUS at 04:34

## 2023-06-26 RX ADMIN — Medication 975 MILLIGRAM(S): at 02:20

## 2023-06-26 RX ADMIN — PIPERACILLIN AND TAZOBACTAM 25 GRAM(S): 4; .5 INJECTION, POWDER, LYOPHILIZED, FOR SOLUTION INTRAVENOUS at 00:55

## 2023-06-26 RX ADMIN — Medication 400 MILLIGRAM(S): at 01:06

## 2023-06-26 RX ADMIN — Medication 400 MILLIGRAM(S): at 19:31

## 2023-06-26 RX ADMIN — Medication 250 MILLIGRAM(S): at 04:34

## 2023-06-26 RX ADMIN — PIPERACILLIN AND TAZOBACTAM 25 GRAM(S): 4; .5 INJECTION, POWDER, LYOPHILIZED, FOR SOLUTION INTRAVENOUS at 09:23

## 2023-06-26 NOTE — CHART NOTE - NSCHARTNOTEFT_GEN_A_CORE
Nurse reached out via teams for tmax 103.1. Patient asymptomatic upon evaluation and rest of vitals were WNL.   Patient was given 650 tylenol PO and rechecked x2 an hour later with temp at 101.3 and 101.5. Vital remain WNL, but systolic BPs soft in the 100s and slightly tachycardic with HR in the 90s.  Patient is lethargic appearing on exam at 0200 (6/26), which is baseline since this episode began per patient and staff. Patient was conversive, in NAD, and had no other complaints. Plan below was discussed with patient and he was agreeable.  Given vitals and uncertain sepsis source, 1L bolus LR and 1x 1g Vanc was given and new sets of blood cultures and a MRSA nares swab were ordered.

## 2023-06-26 NOTE — PROGRESS NOTE ADULT - ASSESSMENT
62 year old male, with history of diabetes and prostate cancer s/p prostatectomy (2020), complaining of lethargy that began 2 days ago (6/23). He was found to be tachycardic and febrile in the ED, although workup showed unremarkable labs, negative UA, negative RVP, and normal CXR, and admitted for SIRS.

## 2023-06-26 NOTE — PROGRESS NOTE ADULT - ATTENDING COMMENTS
Briefly a 62M with DM2 on Metformin and Ozempic, prostate CA s/p RT and prostatectomy in 2020 presents with lethargy, dry cough, fevers/chills since Friday.  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  Currently appears well, tolerating diet and nontoxic appearing.     #SIRS, unclear source (Tm 103, elevated HR>90)  - c/w empiric vanc, zosyn for now - f/u MRSA PCR  - F/U BCx and UCx - NGTD  - lactate WNL  - Persistently febrile - differentials infection, malignancy, thrombosis  - obtain CT chest/abd/pelvis with contrast for better characterization    Discussed with nursing manager, , , and resident team.

## 2023-06-26 NOTE — PROGRESS NOTE ADULT - PROBLEM SELECTOR PLAN 1
-meets SIRS criteria with tachycardia and Tmax 103F  -received zosyn in the ED and on zosyn for empiric coverage   -also started on vanc  -follow up on repeat cultures  - follow up on CT scan to look for occult infection

## 2023-06-26 NOTE — PROGRESS NOTE ADULT - SUBJECTIVE AND OBJECTIVE BOX
PROGRESS NOTE:   Authored by Dr. Juan Diego Briones MD (PGY-1). Pager [facility] [phone]/ [facility]     Patient is a 62y old Male who presents with a chief complaint of increasing lethargy.      SUBJECTIVE / OVERNIGHT EVENTS:  Patient was spiking fevers overnight (Tmax 103F). This morning he experienced lethargy similar to when he came to the hospital.    MEDICATIONS  (STANDING):  dextrose 5%. 1000 milliLiter(s) (50 mL/Hr) IV Continuous <Continuous>  dextrose 5%. 1000 milliLiter(s) (100 mL/Hr) IV Continuous <Continuous>  dextrose 50% Injectable 25 Gram(s) IV Push once  dextrose 50% Injectable 25 Gram(s) IV Push once  dextrose 50% Injectable 12.5 Gram(s) IV Push once  enoxaparin Injectable 40 milliGRAM(s) SubCutaneous every 24 hours  glucagon  Injectable 1 milliGRAM(s) IntraMuscular once  insulin lispro (ADMELOG) corrective regimen sliding scale   SubCutaneous three times a day before meals  insulin lispro (ADMELOG) corrective regimen sliding scale   SubCutaneous at bedtime  piperacillin/tazobactam IVPB.. 3.375 Gram(s) IV Intermittent every 8 hours  vancomycin  IVPB 1500 milliGRAM(s) IV Intermittent every 12 hours    MEDICATIONS  (PRN):  acetaminophen     Tablet .. 650 milliGRAM(s) Oral every 6 hours PRN Temp greater or equal to 38C (100.4F), Mild Pain (1 - 3)  benzonatate 200 milliGRAM(s) Oral every 8 hours PRN Cough  dextrose Oral Gel 15 Gram(s) Oral once PRN Blood Glucose LESS THAN 70 milliGRAM(s)/deciliter  guaiFENesin Oral Liquid (Sugar-Free) 400 milliGRAM(s) Oral every 6 hours PRN Cough      CAPILLARY BLOOD GLUCOSE      POCT Blood Glucose.: 138 mg/dL (26 Jun 2023 12:27)  POCT Blood Glucose.: 114 mg/dL (26 Jun 2023 08:44)  POCT Blood Glucose.: 123 mg/dL (25 Jun 2023 21:59)  POCT Blood Glucose.: 126 mg/dL (25 Jun 2023 17:30)    I&O's Summary      PHYSICAL EXAM:  Vital Signs Last 24 Hrs  T(C): 37.5 (26 Jun 2023 13:30), Max: 39.5 (25 Jun 2023 21:39)  T(F): 99.5 (26 Jun 2023 13:30), Max: 103.1 (25 Jun 2023 21:39)  HR: 93 (26 Jun 2023 13:30) (93 - 96)  BP: 112/74 (26 Jun 2023 13:30) (107/80 - 130/77)  BP(mean): --  RR: 18 (26 Jun 2023 13:30) (16 - 20)  SpO2: 98% (26 Jun 2023 13:30) (96% - 98%)    Parameters below as of 26 Jun 2023 13:30  Patient On (Oxygen Delivery Method): room air        CONSTITUTIONAL: NAD, well-developed  HEET: MMM, EOMI, PERRLA  NECK: supple  RESPIRATORY: Normal respiratory effort; lungs are clear to auscultation bilaterally  CARDIOVASCULAR: Regular rate and rhythm, normal S1 and S2, no murmur/rub/gallop; No lower extremity edema; Peripheral pulses are 2+ bilaterally  ABDOMEN: Nontender to palpation, normoactive bowel sounds, no rebound/guarding; No hepatosplenomegaly  MUSCULOSKELETAL: no clubbing or cyanosis of digits; no joint swelling or tenderness to palpation  NEURO: Moving all four extremities, sensation grossly intact  PSYCH: A+O to person, place, and time; affect appropriate  SKIN: No rash    LABS:                        10.6   8.49  )-----------( 160      ( 26 Jun 2023 04:40 )             33.2     06-26    133<L>  |  98  |  12  ----------------------------<  122<H>  4.0   |  22  |  1.23    Ca    9.0      26 Jun 2023 04:40  Phos  2.1     06-26  Mg     1.80     06-26    TPro  7.2  /  Alb  4.1  /  TBili  1.2  /  DBili  x   /  AST  25  /  ALT  17  /  AlkPhos  61  06-24          Urinalysis Basic - ( 26 Jun 2023 04:40 )    Color: x / Appearance: x / SG: x / pH: x  Gluc: 122 mg/dL / Ketone: x  / Bili: x / Urobili: x   Blood: x / Protein: x / Nitrite: x   Leuk Esterase: x / RBC: x / WBC x   Sq Epi: x / Non Sq Epi: x / Bacteria: x        Culture - Blood (collected 24 Jun 2023 23:40)  Source: .Blood Blood-Peripheral  Preliminary Report (26 Jun 2023 03:02):    No growth to date.    Culture - Urine (collected 24 Jun 2023 23:34)  Source: Clean Catch Clean Catch (Midstream)  Final Report (26 Jun 2023 07:40):    No growth    Culture - Blood (collected 24 Jun 2023 22:25)  Source: .Blood Blood-Peripheral  Preliminary Report (26 Jun 2023 03:01):    No growth to date.        Tele Reviewed:    RADIOLOGY & ADDITIONAL TESTS:  Results Reviewed:   Imaging Personally Reviewed:  Electrocardiogram Personally Reviewed:

## 2023-06-27 LAB
ANION GAP SERPL CALC-SCNC: 12 MMOL/L — SIGNIFICANT CHANGE UP (ref 7–14)
APPEARANCE UR: CLEAR — SIGNIFICANT CHANGE UP
BACTERIA # UR AUTO: NEGATIVE — SIGNIFICANT CHANGE UP
BILIRUB UR-MCNC: NEGATIVE — SIGNIFICANT CHANGE UP
BUN SERPL-MCNC: 9 MG/DL — SIGNIFICANT CHANGE UP (ref 7–23)
CALCIUM SERPL-MCNC: 8.8 MG/DL — SIGNIFICANT CHANGE UP (ref 8.4–10.5)
CHLORIDE SERPL-SCNC: 94 MMOL/L — LOW (ref 98–107)
CO2 SERPL-SCNC: 23 MMOL/L — SIGNIFICANT CHANGE UP (ref 22–31)
COLOR SPEC: YELLOW — SIGNIFICANT CHANGE UP
CREAT SERPL-MCNC: 1.18 MG/DL — SIGNIFICANT CHANGE UP (ref 0.5–1.3)
DIFF PNL FLD: ABNORMAL
EGFR: 70 ML/MIN/1.73M2 — SIGNIFICANT CHANGE UP
EPI CELLS # UR: 1 /HPF — SIGNIFICANT CHANGE UP (ref 0–5)
GLUCOSE BLDC GLUCOMTR-MCNC: 135 MG/DL — HIGH (ref 70–99)
GLUCOSE BLDC GLUCOMTR-MCNC: 167 MG/DL — HIGH (ref 70–99)
GLUCOSE BLDC GLUCOMTR-MCNC: 201 MG/DL — HIGH (ref 70–99)
GLUCOSE BLDC GLUCOMTR-MCNC: 213 MG/DL — HIGH (ref 70–99)
GLUCOSE SERPL-MCNC: 163 MG/DL — HIGH (ref 70–99)
GLUCOSE UR QL: NEGATIVE — SIGNIFICANT CHANGE UP
GRAM STN FLD: SIGNIFICANT CHANGE UP
HCT VFR BLD CALC: 32.6 % — LOW (ref 39–50)
HGB BLD-MCNC: 10.5 G/DL — LOW (ref 13–17)
HIV 1+2 AB+HIV1 P24 AG SERPL QL IA: SIGNIFICANT CHANGE UP
HYALINE CASTS # UR AUTO: 1 /LPF — SIGNIFICANT CHANGE UP (ref 0–7)
KETONES UR-MCNC: NEGATIVE — SIGNIFICANT CHANGE UP
LEUKOCYTE ESTERASE UR-ACNC: NEGATIVE — SIGNIFICANT CHANGE UP
MAGNESIUM SERPL-MCNC: 1.9 MG/DL — SIGNIFICANT CHANGE UP (ref 1.6–2.6)
MCHC RBC-ENTMCNC: 28.8 PG — SIGNIFICANT CHANGE UP (ref 27–34)
MCHC RBC-ENTMCNC: 32.2 GM/DL — SIGNIFICANT CHANGE UP (ref 32–36)
MCV RBC AUTO: 89.6 FL — SIGNIFICANT CHANGE UP (ref 80–100)
NITRITE UR-MCNC: NEGATIVE — SIGNIFICANT CHANGE UP
NRBC # BLD: 0 /100 WBCS — SIGNIFICANT CHANGE UP (ref 0–0)
NRBC # FLD: 0 K/UL — SIGNIFICANT CHANGE UP (ref 0–0)
PH UR: 6.5 — SIGNIFICANT CHANGE UP (ref 5–8)
PHOSPHATE SERPL-MCNC: 1.3 MG/DL — LOW (ref 2.5–4.5)
PLATELET # BLD AUTO: 158 K/UL — SIGNIFICANT CHANGE UP (ref 150–400)
POTASSIUM SERPL-MCNC: 4.1 MMOL/L — SIGNIFICANT CHANGE UP (ref 3.5–5.3)
POTASSIUM SERPL-SCNC: 4.1 MMOL/L — SIGNIFICANT CHANGE UP (ref 3.5–5.3)
PROT UR-MCNC: ABNORMAL
RBC # BLD: 3.64 M/UL — LOW (ref 4.2–5.8)
RBC # FLD: 13.7 % — SIGNIFICANT CHANGE UP (ref 10.3–14.5)
RBC CASTS # UR COMP ASSIST: 11 /HPF — HIGH (ref 0–4)
SODIUM SERPL-SCNC: 129 MMOL/L — LOW (ref 135–145)
SP GR SPEC: 1.03 — SIGNIFICANT CHANGE UP (ref 1.01–1.05)
SPECIMEN SOURCE: SIGNIFICANT CHANGE UP
UROBILINOGEN FLD QL: ABNORMAL
WBC # BLD: 6.41 K/UL — SIGNIFICANT CHANGE UP (ref 3.8–10.5)
WBC # FLD AUTO: 6.41 K/UL — SIGNIFICANT CHANGE UP (ref 3.8–10.5)
WBC UR QL: 4 /HPF — SIGNIFICANT CHANGE UP (ref 0–5)

## 2023-06-27 PROCEDURE — 71260 CT THORAX DX C+: CPT | Mod: 26

## 2023-06-27 PROCEDURE — 99222 1ST HOSP IP/OBS MODERATE 55: CPT

## 2023-06-27 PROCEDURE — 74177 CT ABD & PELVIS W/CONTRAST: CPT | Mod: 26

## 2023-06-27 PROCEDURE — 99233 SBSQ HOSP IP/OBS HIGH 50: CPT

## 2023-06-27 RX ORDER — VANCOMYCIN HCL 1 G
1500 VIAL (EA) INTRAVENOUS EVERY 12 HOURS
Refills: 0 | Status: DISCONTINUED | OUTPATIENT
Start: 2023-06-27 | End: 2023-06-27

## 2023-06-27 RX ORDER — VANCOMYCIN HCL 1 G
1500 VIAL (EA) INTRAVENOUS ONCE
Refills: 0 | Status: COMPLETED | OUTPATIENT
Start: 2023-06-27 | End: 2023-06-27

## 2023-06-27 RX ORDER — SODIUM CHLORIDE 9 MG/ML
4 INJECTION INTRAMUSCULAR; INTRAVENOUS; SUBCUTANEOUS EVERY 12 HOURS
Refills: 0 | Status: DISCONTINUED | OUTPATIENT
Start: 2023-06-27 | End: 2023-06-27

## 2023-06-27 RX ORDER — SENNA PLUS 8.6 MG/1
2 TABLET ORAL AT BEDTIME
Refills: 0 | Status: DISCONTINUED | OUTPATIENT
Start: 2023-06-27 | End: 2023-07-02

## 2023-06-27 RX ORDER — POLYETHYLENE GLYCOL 3350 17 G/17G
17 POWDER, FOR SOLUTION ORAL DAILY
Refills: 0 | Status: DISCONTINUED | OUTPATIENT
Start: 2023-06-27 | End: 2023-07-02

## 2023-06-27 RX ORDER — VANCOMYCIN HCL 1 G
VIAL (EA) INTRAVENOUS
Refills: 0 | Status: DISCONTINUED | OUTPATIENT
Start: 2023-06-27 | End: 2023-06-27

## 2023-06-27 RX ORDER — LOSARTAN POTASSIUM 100 MG/1
25 TABLET, FILM COATED ORAL ONCE
Refills: 0 | Status: DISCONTINUED | OUTPATIENT
Start: 2023-06-27 | End: 2023-06-27

## 2023-06-27 RX ORDER — FERROUS SULFATE 325(65) MG
325 TABLET ORAL DAILY
Refills: 0 | Status: DISCONTINUED | OUTPATIENT
Start: 2023-06-27 | End: 2023-07-02

## 2023-06-27 RX ORDER — ACETAMINOPHEN 500 MG
1000 TABLET ORAL ONCE
Refills: 0 | Status: COMPLETED | OUTPATIENT
Start: 2023-06-27 | End: 2023-06-27

## 2023-06-27 RX ADMIN — Medication 325 MILLIGRAM(S): at 13:24

## 2023-06-27 RX ADMIN — PIPERACILLIN AND TAZOBACTAM 25 GRAM(S): 4; .5 INJECTION, POWDER, LYOPHILIZED, FOR SOLUTION INTRAVENOUS at 02:26

## 2023-06-27 RX ADMIN — Medication 400 MILLIGRAM(S): at 19:00

## 2023-06-27 RX ADMIN — PIPERACILLIN AND TAZOBACTAM 25 GRAM(S): 4; .5 INJECTION, POWDER, LYOPHILIZED, FOR SOLUTION INTRAVENOUS at 20:54

## 2023-06-27 RX ADMIN — Medication 2: at 18:00

## 2023-06-27 RX ADMIN — Medication 650 MILLIGRAM(S): at 08:00

## 2023-06-27 RX ADMIN — Medication 2: at 13:09

## 2023-06-27 RX ADMIN — Medication 300 MILLIGRAM(S): at 08:41

## 2023-06-27 RX ADMIN — Medication 63.75 MILLIMOLE(S): at 19:07

## 2023-06-27 RX ADMIN — Medication 200 MILLIGRAM(S): at 13:09

## 2023-06-27 RX ADMIN — Medication 650 MILLIGRAM(S): at 07:09

## 2023-06-27 RX ADMIN — Medication 1: at 08:43

## 2023-06-27 RX ADMIN — PIPERACILLIN AND TAZOBACTAM 25 GRAM(S): 4; .5 INJECTION, POWDER, LYOPHILIZED, FOR SOLUTION INTRAVENOUS at 13:09

## 2023-06-27 RX ADMIN — Medication 400 MILLIGRAM(S): at 13:09

## 2023-06-27 RX ADMIN — SODIUM CHLORIDE 4 MILLILITER(S): 9 INJECTION INTRAMUSCULAR; INTRAVENOUS; SUBCUTANEOUS at 09:35

## 2023-06-27 NOTE — PROVIDER CONTACT NOTE (OTHER) - ASSESSMENT
Patient in no acute distress.
pt warm to touch with non productive cough
Patient feels warm to the touch. Patient denies chills.
Patient in no acute distress. denies n/v/d, chest pain, headache. Vancomycin and LR ordered not compatible with zosyn which is already running through IV line. Patient in need of second IV placement but refusing. Unable to give vancomycin and LR due to refusal
Patient in no acute distress.
Patient in no acute distress. denies n/v/d, chest pain, headache
vital
Patient states "650MG is not enough. I need 975MG"

## 2023-06-27 NOTE — CONSULT NOTE ADULT - SUBJECTIVE AND OBJECTIVE BOX
"HPI:  CC: 62 year old male with pmhx of diabetes and prostate cancer s/p radiation and prostatectomy (2020) came to the hospital complaining of lethargy since 6/23.    HPI: 62 year old male complaining of lethargy since 6/23. He cannot identify any inciting factors and has not felt this lethargic before. He did not try any medications to improve his lethargy. He also experienced chills, cough, and subjective fevers during this time period. He denies recent changes in his weight, night sweats, shortness of breath, and changes in bowel and bladder habits.     In the ED, patient was given tylenol, metoclopramide, and fluids. He was also found to be tachycardic and febrile (Tmax 103F) and admitted for sepsis.  (25 Jun 2023 07:01)"    Above reviewed. 61 yo M with DM, prostate CA initially with lethargy  Patient continues to feel lethargic at bedside despite antibiotics  No chest pain, has cough  Minimal shortness of breath  Still with ongoing fevers  No n/V/D  No dysuria, no pyuria, ID called for further eval    PAST MEDICAL & SURGICAL HISTORY:  Sleep Apnea - diagnosed 3-4 yrs ago - use CPAP regularly      Obesity      Diabetes      Prostate CA      I & D of Abscess - rectal  6 yrs ago      H/O prostatectomy    Allergies    No Known Allergies    Intolerances    ANTIMICROBIALS:  piperacillin/tazobactam IVPB.. 3.375 every 8 hours    OTHER MEDS:  acetaminophen     Tablet .. 650 milliGRAM(s) Oral every 6 hours PRN  benzonatate 200 milliGRAM(s) Oral every 8 hours PRN  dextrose 5%. 1000 milliLiter(s) IV Continuous <Continuous>  dextrose 5%. 1000 milliLiter(s) IV Continuous <Continuous>  dextrose 50% Injectable 25 Gram(s) IV Push once  dextrose 50% Injectable 25 Gram(s) IV Push once  dextrose 50% Injectable 12.5 Gram(s) IV Push once  dextrose Oral Gel 15 Gram(s) Oral once PRN  enoxaparin Injectable 40 milliGRAM(s) SubCutaneous every 24 hours  ferrous    sulfate 325 milliGRAM(s) Oral daily  glucagon  Injectable 1 milliGRAM(s) IntraMuscular once  guaiFENesin Oral Liquid (Sugar-Free) 400 milliGRAM(s) Oral every 6 hours PRN  insulin lispro (ADMELOG) corrective regimen sliding scale   SubCutaneous three times a day before meals  insulin lispro (ADMELOG) corrective regimen sliding scale   SubCutaneous at bedtime  polyethylene glycol 3350 17 Gram(s) Oral daily  senna 2 Tablet(s) Oral at bedtime  sodium chloride 3%  Inhalation 4 milliLiter(s) Inhalation every 12 hours  sodium phosphate 15 milliMole(s)/250 mL IVPB 15 milliMole(s) IV Intermittent once    SOCIAL HISTORY: No tobacco, no alcohol, no illicit drugs    FAMILY HISTORY: No pertinent family history relating to chief complaint    Drug Dosing Weight  Height (cm): 179.1 (25 Jun 2023 07:07)  Weight (kg): 113 (25 Jun 2023 07:07)  BMI (kg/m2): 35.2 (25 Jun 2023 07:07)  BSA (m2): 2.3 (25 Jun 2023 07:07)    PE:    Vital Signs Last 24 Hrs  T(C): 36.9 (27 Jun 2023 12:00), Max: 39.4 (26 Jun 2023 18:30)  T(F): 98.5 (27 Jun 2023 12:00), Max: 102.9 (26 Jun 2023 18:30)  HR: 86 (27 Jun 2023 12:00) (74 - 97)  BP: 129/70 (27 Jun 2023 12:00) (114/72 - 129/70)  RR: 17 (27 Jun 2023 12:00) (16 - 17)  SpO2: 95% (27 Jun 2023 12:00) (95% - 98%)    Gen: AOx3, NAD, non-toxic, lethargic  CV: S1+S2 normal, nontachycardic  Resp: Clear bilat, no resp distress, no crackles/wheezes  Abd: Soft, nontender, +BS  Ext: No LE edema, no wounds  : No Javier  IV/Skin: No thrombophlebitis  Msk: No low back pain, no arthralgias, no joint swelling  Neuro: No sensory deficits, no motor deficits    LABS:                        10.5   6.41  )-----------( 158      ( 27 Jun 2023 06:15 )             32.6     06-27    129<L>  |  94<L>  |  9   ----------------------------<  163<H>  4.1   |  23  |  1.18    Ca    8.8      27 Jun 2023 06:15  Phos  1.3     06-27  Mg     1.90     06-27    Urinalysis Basic - ( 27 Jun 2023 06:15 )    Color: x / Appearance: x / SG: x / pH: x  Gluc: 163 mg/dL / Ketone: x  / Bili: x / Urobili: x   Blood: x / Protein: x / Nitrite: x   Leuk Esterase: x / RBC: x / WBC x   Sq Epi: x / Non Sq Epi: x / Bacteria: x    MICROBIOLOGY:    .Blood Blood-Peripheral  06-26-23   No growth at 24 hours  --  --    .Blood Blood-Peripheral  06-26-23   No growth at 24 hours  --  --    .Blood Blood-Peripheral  06-24-23   No growth to date.  --  --    Clean Catch Clean Catch (Midstream)  06-24-23   No growth  --  --    .Blood Blood-Peripheral  06-24-23   No growth to date.  --  --    Rapid RVP Result: [place] (06-24 @ 22:19)    RADIOLOGY:    6/27 CT:    IMPRESSION:  *  Right lower lobe consolidation and groundglass opacity, concerning for   infectious etiology. Follow-up to resolution is recommended.  *  Right hilar and mediastinal adenopathy, which may be reactive.   Attention on follow-up imaging.

## 2023-06-27 NOTE — PROGRESS NOTE ADULT - PROBLEM SELECTOR PLAN 1
-meets SIRS criteria with tachycardia and Tmax 103F  -received zosyn in the ED and on zosyn for empiric coverage   -also started on vanc  -follow up on repeat cultures  - follow up on CT scan to look for occult infection  -HIV negative  -Consider TB testing  -Appreciate ID recs

## 2023-06-27 NOTE — CONSULT NOTE ADULT - ASSESSMENT
61 yo M with DM, prostate CA initially with lethargy  Fever, no leukocytosis  CT with RLL consolidation, LAD  urine legionella negative  BCXs NGTD  LFTs WNL  UA neg, UCX neg  HIV neg  Uncertain why slow response to antibiotic, still ongoing fevers; due to severe pneumonia with high bacterial burden?  Overall, PNA, fever, abnormal finding on imaging  - Zosyn 3.375g q 8  - Agree continue off Vanco  - F/U sputum culture  - F/U pending BCXs  - Monitor for further fevers/for improvement  - Close monitoring resp status  - Any further workup for LAD per team    Spencer Lee MD  Contact on TEAMS messaging from 9am - 5pm  From 5pm-9am, on weekends, or if no response call [phone]

## 2023-06-27 NOTE — PROGRESS NOTE ADULT - PROBLEM SELECTOR PLAN 5
-Hb 10.6; appears to be at baseline (11)  -started on oral iron -Hb 10.5 (6/27); appears to be at baseline (11)  -started on oral iron

## 2023-06-27 NOTE — PROGRESS NOTE ADULT - SUBJECTIVE AND OBJECTIVE BOX
PROGRESS NOTE:   Authored by Dr. Juan Diego Briones MD (PGY-1). Pager [facility] [phone]/ [facility]     Patient is a 62y old  Male who presents with a chief complaint of     SUBJECTIVE / OVERNIGHT EVENTS:  Overnight, patient was spiking fevers (Tmax 38.9C) and his lethargy is unchanged.    MEDICATIONS  (STANDING):  dextrose 5%. 1000 milliLiter(s) (50 mL/Hr) IV Continuous <Continuous>  dextrose 5%. 1000 milliLiter(s) (100 mL/Hr) IV Continuous <Continuous>  dextrose 50% Injectable 25 Gram(s) IV Push once  dextrose 50% Injectable 25 Gram(s) IV Push once  dextrose 50% Injectable 12.5 Gram(s) IV Push once  enoxaparin Injectable 40 milliGRAM(s) SubCutaneous every 24 hours  ferrous    sulfate 325 milliGRAM(s) Oral daily  glucagon  Injectable 1 milliGRAM(s) IntraMuscular once  insulin lispro (ADMELOG) corrective regimen sliding scale   SubCutaneous three times a day before meals  insulin lispro (ADMELOG) corrective regimen sliding scale   SubCutaneous at bedtime  piperacillin/tazobactam IVPB.. 3.375 Gram(s) IV Intermittent every 8 hours  polyethylene glycol 3350 17 Gram(s) Oral daily  senna 2 Tablet(s) Oral at bedtime  sodium chloride 3%  Inhalation 4 milliLiter(s) Inhalation every 12 hours  vancomycin  IVPB 1500 milliGRAM(s) IV Intermittent every 12 hours  vancomycin  IVPB        MEDICATIONS  (PRN):  acetaminophen     Tablet .. 650 milliGRAM(s) Oral every 6 hours PRN Temp greater or equal to 38C (100.4F), Mild Pain (1 - 3)  benzonatate 200 milliGRAM(s) Oral every 8 hours PRN Cough  dextrose Oral Gel 15 Gram(s) Oral once PRN Blood Glucose LESS THAN 70 milliGRAM(s)/deciliter  guaiFENesin Oral Liquid (Sugar-Free) 400 milliGRAM(s) Oral every 6 hours PRN Cough      CAPILLARY BLOOD GLUCOSE      POCT Blood Glucose.: 167 mg/dL (27 Jun 2023 08:32)  POCT Blood Glucose.: 183 mg/dL (26 Jun 2023 21:07)  POCT Blood Glucose.: 135 mg/dL (26 Jun 2023 18:00)  POCT Blood Glucose.: 138 mg/dL (26 Jun 2023 12:27)    I&O's Summary      PHYSICAL EXAM:  Vital Signs Last 24 Hrs  T(C): 38.4 (27 Jun 2023 06:08), Max: 39.4 (26 Jun 2023 18:30)  T(F): 101.1 (27 Jun 2023 06:08), Max: 102.9 (26 Jun 2023 18:30)  HR: 84 (27 Jun 2023 09:33) (74 - 97)  BP: 122/72 (27 Jun 2023 06:08) (112/74 - 130/77)  BP(mean): --  RR: 16 (27 Jun 2023 06:08) (16 - 18)  SpO2: 98% (27 Jun 2023 09:33) (96% - 98%)    Parameters below as of 27 Jun 2023 09:33  Patient On (Oxygen Delivery Method): room air        CONSTITUTIONAL: NAD, well-developed  HEET: MMM, EOMI, PERRLA  NECK: supple  RESPIRATORY: Normal respiratory effort; lungs are clear to auscultation bilaterally  CARDIOVASCULAR: Regular rate and rhythm, normal S1 and S2, no murmur/rub/gallop; No lower extremity edema; Peripheral pulses are 2+ bilaterally  ABDOMEN: Nontender to palpation, normoactive bowel sounds, no rebound/guarding; No hepatosplenomegaly  MUSCULOSKELETAL: 1+ pitting edema LLE. no clubbing or cyanosis of digits; no joint swelling or tenderness to palpation  NEURO: Moving all four extremities, sensation grossly intact  PSYCH: A+O to person, place, and time; affect appropriate  SKIN: No rash    LABS:                        10.5   6.41  )-----------( 158      ( 27 Jun 2023 06:15 )             32.6     06-27    129<L>  |  94<L>  |  9   ----------------------------<  163<H>  4.1   |  23  |  1.18    Ca    8.8      27 Jun 2023 06:15  Phos  1.3     06-27  Mg     1.90     06-27            Urinalysis Basic - ( 27 Jun 2023 06:15 )    Color: x / Appearance: x / SG: x / pH: x  Gluc: 163 mg/dL / Ketone: x  / Bili: x / Urobili: x   Blood: x / Protein: x / Nitrite: x   Leuk Esterase: x / RBC: x / WBC x   Sq Epi: x / Non Sq Epi: x / Bacteria: x        Culture - Blood (collected 24 Jun 2023 23:40)  Source: .Blood Blood-Peripheral  Preliminary Report (26 Jun 2023 03:02):    No growth to date.    Culture - Urine (collected 24 Jun 2023 23:34)  Source: Clean Catch Clean Catch (Midstream)  Final Report (26 Jun 2023 07:40):    No growth    Culture - Blood (collected 24 Jun 2023 22:25)  Source: .Blood Blood-Peripheral  Preliminary Report (26 Jun 2023 03:01):    No growth to date.        Tele Reviewed:    RADIOLOGY & ADDITIONAL TESTS:  Results Reviewed:   Imaging Personally Reviewed:  Electrocardiogram Personally Reviewed:

## 2023-06-27 NOTE — PROGRESS NOTE ADULT - ATTENDING COMMENTS
Briefly a 62M with DM2 on Metformin and Ozempic, prostate CA s/p RT and prostatectomy in 2020 presents with lethargy, dry cough, fevers/chills since Friday.  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  Currently appears well, tolerating diet and nontoxic appearing.     Still febrile. Still coughing. No other acute complaints.    Physical exam unchanged overall.  AAOx3, NAD  RRR, lungs CTAB  oral mucosa moist, without erythema, discharge, or swelling  abd benign, soft, NTND  5/5 strength all extremities  2+ pulses all extremities  b/l LE without pallor, edema, excess warmth to touch    #SIRS, unclear source (Tm 103, elevated HR>90)  - c/w empiric zosyn for now; MRSA negative, stop vancomycin  - F/U BCx and UCx - NGTD  - lactate WNL  - Persistently febrile - differentials infection, malignancy, thrombosis  - pending CT chest/abd/pelvis with contrast for better characterization

## 2023-06-28 LAB
ANION GAP SERPL CALC-SCNC: 11 MMOL/L — SIGNIFICANT CHANGE UP (ref 7–14)
BUN SERPL-MCNC: 10 MG/DL — SIGNIFICANT CHANGE UP (ref 7–23)
CALCIUM SERPL-MCNC: 9.1 MG/DL — SIGNIFICANT CHANGE UP (ref 8.4–10.5)
CHLORIDE SERPL-SCNC: 94 MMOL/L — LOW (ref 98–107)
CO2 SERPL-SCNC: 26 MMOL/L — SIGNIFICANT CHANGE UP (ref 22–31)
CREAT SERPL-MCNC: 1.21 MG/DL — SIGNIFICANT CHANGE UP (ref 0.5–1.3)
CULTURE RESULTS: NO GROWTH — SIGNIFICANT CHANGE UP
EGFR: 68 ML/MIN/1.73M2 — SIGNIFICANT CHANGE UP
GLUCOSE BLDC GLUCOMTR-MCNC: 114 MG/DL — HIGH (ref 70–99)
GLUCOSE BLDC GLUCOMTR-MCNC: 138 MG/DL — HIGH (ref 70–99)
GLUCOSE BLDC GLUCOMTR-MCNC: 145 MG/DL — HIGH (ref 70–99)
GLUCOSE BLDC GLUCOMTR-MCNC: 164 MG/DL — HIGH (ref 70–99)
GLUCOSE SERPL-MCNC: 128 MG/DL — HIGH (ref 70–99)
HCT VFR BLD CALC: 32.5 % — LOW (ref 39–50)
HGB BLD-MCNC: 10.3 G/DL — LOW (ref 13–17)
MAGNESIUM SERPL-MCNC: 2 MG/DL — SIGNIFICANT CHANGE UP (ref 1.6–2.6)
MCHC RBC-ENTMCNC: 28.5 PG — SIGNIFICANT CHANGE UP (ref 27–34)
MCHC RBC-ENTMCNC: 31.7 GM/DL — LOW (ref 32–36)
MCV RBC AUTO: 89.8 FL — SIGNIFICANT CHANGE UP (ref 80–100)
NRBC # BLD: 0 /100 WBCS — SIGNIFICANT CHANGE UP (ref 0–0)
NRBC # FLD: 0 K/UL — SIGNIFICANT CHANGE UP (ref 0–0)
PHOSPHATE SERPL-MCNC: 2.2 MG/DL — LOW (ref 2.5–4.5)
PLATELET # BLD AUTO: 164 K/UL — SIGNIFICANT CHANGE UP (ref 150–400)
POTASSIUM SERPL-MCNC: 4.2 MMOL/L — SIGNIFICANT CHANGE UP (ref 3.5–5.3)
POTASSIUM SERPL-SCNC: 4.2 MMOL/L — SIGNIFICANT CHANGE UP (ref 3.5–5.3)
RBC # BLD: 3.62 M/UL — LOW (ref 4.2–5.8)
RBC # FLD: 13.8 % — SIGNIFICANT CHANGE UP (ref 10.3–14.5)
S PNEUM AG UR QL: NEGATIVE — SIGNIFICANT CHANGE UP
SODIUM SERPL-SCNC: 131 MMOL/L — LOW (ref 135–145)
SPECIMEN SOURCE: SIGNIFICANT CHANGE UP
WBC # BLD: 6.95 K/UL — SIGNIFICANT CHANGE UP (ref 3.8–10.5)
WBC # FLD AUTO: 6.95 K/UL — SIGNIFICANT CHANGE UP (ref 3.8–10.5)

## 2023-06-28 PROCEDURE — 99233 SBSQ HOSP IP/OBS HIGH 50: CPT

## 2023-06-28 PROCEDURE — 99232 SBSQ HOSP IP/OBS MODERATE 35: CPT

## 2023-06-28 RX ORDER — AZITHROMYCIN 500 MG/1
TABLET, FILM COATED ORAL
Refills: 0 | Status: DISCONTINUED | OUTPATIENT
Start: 2023-06-28 | End: 2023-07-02

## 2023-06-28 RX ORDER — AZITHROMYCIN 500 MG/1
500 TABLET, FILM COATED ORAL EVERY 24 HOURS
Refills: 0 | Status: DISCONTINUED | OUTPATIENT
Start: 2023-06-29 | End: 2023-07-02

## 2023-06-28 RX ORDER — AZITHROMYCIN 500 MG/1
500 TABLET, FILM COATED ORAL ONCE
Refills: 0 | Status: COMPLETED | OUTPATIENT
Start: 2023-06-28 | End: 2023-06-28

## 2023-06-28 RX ORDER — ACETAMINOPHEN 500 MG
1000 TABLET ORAL ONCE
Refills: 0 | Status: COMPLETED | OUTPATIENT
Start: 2023-06-28 | End: 2023-06-28

## 2023-06-28 RX ORDER — MEROPENEM 1 G/30ML
1000 INJECTION INTRAVENOUS EVERY 8 HOURS
Refills: 0 | Status: DISCONTINUED | OUTPATIENT
Start: 2023-06-28 | End: 2023-07-02

## 2023-06-28 RX ADMIN — MEROPENEM 100 MILLIGRAM(S): 1 INJECTION INTRAVENOUS at 22:15

## 2023-06-28 RX ADMIN — PIPERACILLIN AND TAZOBACTAM 25 GRAM(S): 4; .5 INJECTION, POWDER, LYOPHILIZED, FOR SOLUTION INTRAVENOUS at 04:18

## 2023-06-28 RX ADMIN — AZITHROMYCIN 255 MILLIGRAM(S): 500 TABLET, FILM COATED ORAL at 14:46

## 2023-06-28 RX ADMIN — Medication 1000 MILLIGRAM(S): at 02:06

## 2023-06-28 RX ADMIN — Medication 650 MILLIGRAM(S): at 13:42

## 2023-06-28 RX ADMIN — Medication 1: at 12:35

## 2023-06-28 RX ADMIN — Medication 325 MILLIGRAM(S): at 11:04

## 2023-06-28 RX ADMIN — SENNA PLUS 2 TABLET(S): 8.6 TABLET ORAL at 22:15

## 2023-06-28 RX ADMIN — PIPERACILLIN AND TAZOBACTAM 25 GRAM(S): 4; .5 INJECTION, POWDER, LYOPHILIZED, FOR SOLUTION INTRAVENOUS at 11:04

## 2023-06-28 RX ADMIN — Medication 400 MILLIGRAM(S): at 02:11

## 2023-06-28 RX ADMIN — POLYETHYLENE GLYCOL 3350 17 GRAM(S): 17 POWDER, FOR SOLUTION ORAL at 11:04

## 2023-06-28 RX ADMIN — Medication 85 MILLIMOLE(S): at 10:32

## 2023-06-28 NOTE — PROVIDER CONTACT NOTE (OTHER) - SITUATION
Patient temp 101.5
Temp. 102. repeated 101.2
Patient refusing second IV line
Patient temp 103.1
Temp 102.9
Patient temp 101.8
Patient refuses lovenox sq and senna tablets
Patient refusing PO Tylenol
Temp 101.4
Temp 102.7
oral temp 103.2

## 2023-06-28 NOTE — PROGRESS NOTE ADULT - ATTENDING COMMENTS
Briefly a 62M with DM2 on Metformin and Ozempic, prostate CA s/p RT and prostatectomy in 2020 presents with lethargy, dry cough, fevers/chills since Friday.  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  Currently appears well, tolerating diet and nontoxic appearing.     Still febrile. Still coughing. No other acute complaints. CT scan revealed RLL pneumonia, discussed at length with patient.     Physical exam unchanged overall.  AAOx3, NAD  RRR, lungs CTAB  oral mucosa moist, without erythema, discharge, or swelling  abd benign, soft, NTND  5/5 strength all extremities  2+ pulses all extremities  b/l LE without pallor, edema, excess warmth to touch    #Sepsis due to RLL PNA  - c/w empiric zosyn for now; MRSA negative, stopped vancomycin  - F/U BCx and UCx - NGTD  - sputum Cx with multiple organisms, likely contaminated by oral phill  - lactate WNL  - Persistently febrile  - ID consulted, appreciate input    #Persistent cough  - in setting of RLL PNA  - largely nonproductive  - guaifenesin, benzonatate, hycodan PRN  - incentive spirometry    #Hilar and mediastinal adenopathy  - as seen on CT chest, likely reactive in setting of infection  - outpatient follow-up for resolution 1 month after discharge Briefly a 62M with DM2 on Metformin and Ozempic, prostate CA s/p RT and prostatectomy in 2020 presents with lethargy, dry cough, fevers/chills since Friday.  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  Currently appears well, tolerating diet and nontoxic appearing.     Still febrile. Still coughing. No other acute complaints. CT scan revealed RLL pneumonia, discussed at length with patient.     Physical exam unchanged overall.  AAOx3, NAD  RRR, lungs CTAB  oral mucosa moist, without erythema, discharge, or swelling  abd benign, soft, NTND  5/5 strength all extremities  2+ pulses all extremities  b/l LE without pallor, edema, excess warmth to touch    #Sepsis due to RLL PNA  - Persistently febrile  - as seen on CT chest  - ID consulted, appreciate input  - MRSA PCR neg, off vancomycin  - switching from zosyn to meropenem  - adding azithromycin  - F/U BCx and UCx - NGTD  - sputum Cx with multiple organisms, likely contaminated by oral phill  - obtain legionella resp Cx    #Persistent cough  - in setting of RLL PNA  - largely nonproductive  - guaifenesin, benzonatate, hycodan PRN  - incentive spirometry    #Hilar and mediastinal adenopathy  - as seen on CT chest, likely reactive in setting of infection  - outpatient follow-up for resolution 1 month after discharge    Discussed with patient and his wife Isela over the phone while at bedside.

## 2023-06-28 NOTE — PROGRESS NOTE ADULT - PROBLEM SELECTOR PLAN 5
-Hb 10.5 (6/27); appears to be at baseline (11)  -started on oral iron -Hb 10.8 (6/28); appears to be at baseline (11)  -started on oral iron -Hb 10.3 (6/28); appears to be at baseline (11)  -started on oral iron

## 2023-06-28 NOTE — PROGRESS NOTE ADULT - ASSESSMENT
61 yo M with DM, prostate CA initially with lethargy  Fever, no leukocytosis  CT with RLL consolidation, LAD  urine legionella negative  BCXs NGTD  LFTs WNL  UA neg, UCX neg  HIV neg  Still fevers through zosyn, still fatigued/ill appearing today, escalate abx  Overall, PNA, fever, abnormal finding on imaging  - Zane 1g q 8  - DC Zosyn  - Add azithromycin 500mg q 24  - F/U sputum culture  - Send sputum culture for legionella  - F/U pending BCXs  - Monitor for further fevers/for improvement  - Close monitoring resp status  - Any further workup for LAD per team    My colleagues will be covering this patient starting on 6/29/23. Please call [phone] or on call fellow with any questions or change in status.     Spencer Lee MD  Contact on TEAMS messaging from 9am - 5pm  From 5pm-9am, on weekends, or if no response call [phone]

## 2023-06-28 NOTE — PROVIDER CONTACT NOTE (OTHER) - RECOMMENDATIONS
Notify MD
Notify MD. Order a one time dose.
Notify MD give Acetominophen as ordered
Sina Huggins notified
Sina Huggins notified
iSna Huggins notified
notify md, apply cold compresses, iv tylenol requested
Notify MD
Notify MD give Acetominophen as ordered
Notify MD. Administer PO tylenol.
sputum and urine culture

## 2023-06-28 NOTE — PROVIDER CONTACT NOTE (OTHER) - DATE AND TIME:
26-Jun-2023 18:35
25-Jun-2023 21:45
27-Jun-2023 21:50
28-Jun-2023 02:19
28-Jun-2023 03:14
26-Jun-2023 01:05
26-Jun-2023 03:45
26-Jun-2023 18:40
27-Jun-2023 18:08
26-Jun-2023 02:20
26-Jun-2023 23:50

## 2023-06-28 NOTE — PROVIDER CONTACT NOTE (OTHER) - REASON
Patient temp 101.8
Patient refuses lovenox sq and senna tablets
Patient refusing PO Tylenol
Patient refusing second IV line
Patient temp 101.5
Temp 101.4
Temp 102.7
Temp. 102. repeated 101.2
pt febrile
Patient temp 103.1
Temp 102.9

## 2023-06-28 NOTE — PROGRESS NOTE ADULT - PROBLEM SELECTOR PLAN 1
-CT scan shows signs of RLL PNA  - Follow ID rec, c/w vanc 3.375g Q8  -meets SIRS criteria with tachycardia and Tmax 103F  -received zosyn in the ED and on zosyn for empiric coverage   -also started on vanc  -follow up on repeat cultures  - follow up on CT scan to look for occult infection  -HIV negative -CT scan shows signs of RLL PNA  - Follow ID rec, c/w zosyn 3.375g Q8; discuss w ID why still spiking fevers  -meets SIRS criteria with tachycardia and Tmax 103F  -received zosyn in the ED and on zosyn for empiric coverage   -f/u TB testing  -HIV negative

## 2023-06-28 NOTE — PROVIDER CONTACT NOTE (OTHER) - ACTION/TREATMENT ORDERED:
MD made aware, will order acetaminophen 975mg
MD made aware, will try to come see patient
MD made aware. Administer PO tylenol.
MD made aware. Will order a one time dose.
No action required at this time. Continue to monitor
Risks and benefits explained. Patient verbalized understanding but continues to refuse
Tylenol given. Will recheck temp
will execute MD order
MD made aware, give PRN as ordered
MD made aware, no further interventions

## 2023-06-28 NOTE — PROVIDER CONTACT NOTE (OTHER) - NAME OF MD/NP/PA/DO NOTIFIED:
MD Brizuela
Bud Banda
MD Claire
Sina Huggins
MD Claire
MD Juan Diego Briones
Bud Zayas MD
Bud Banda
Bud Zayas MD

## 2023-06-28 NOTE — PROGRESS NOTE ADULT - SUBJECTIVE AND OBJECTIVE BOX
PROGRESS NOTE:   Authored by Dr. Juan Diego Briones MD (PGY-1). Pager [facility] [phone]/ [facility]     Patient is a 62y old  Male who presents with a chief complaint of Sepsis (27 Jun 2023 09:53)      SUBJECTIVE / OVERNIGHT EVENTS:  Yesterday evening (6/27) and overnight (6/28), patient was spiking fevers (Tmax 103F). His lethargy is unchanged.    MEDICATIONS  (STANDING):  dextrose 5%. 1000 milliLiter(s) (100 mL/Hr) IV Continuous <Continuous>  dextrose 5%. 1000 milliLiter(s) (50 mL/Hr) IV Continuous <Continuous>  dextrose 50% Injectable 25 Gram(s) IV Push once  dextrose 50% Injectable 12.5 Gram(s) IV Push once  dextrose 50% Injectable 25 Gram(s) IV Push once  enoxaparin Injectable 40 milliGRAM(s) SubCutaneous every 24 hours  ferrous    sulfate 325 milliGRAM(s) Oral daily  glucagon  Injectable 1 milliGRAM(s) IntraMuscular once  insulin lispro (ADMELOG) corrective regimen sliding scale   SubCutaneous three times a day before meals  insulin lispro (ADMELOG) corrective regimen sliding scale   SubCutaneous at bedtime  piperacillin/tazobactam IVPB.. 3.375 Gram(s) IV Intermittent every 8 hours  polyethylene glycol 3350 17 Gram(s) Oral daily  senna 2 Tablet(s) Oral at bedtime  sodium phosphate 30 milliMole(s)/500 mL IVPB 30 milliMole(s) IV Intermittent once    MEDICATIONS  (PRN):  acetaminophen     Tablet .. 650 milliGRAM(s) Oral every 6 hours PRN Temp greater or equal to 38C (100.4F), Mild Pain (1 - 3)  benzonatate 200 milliGRAM(s) Oral every 8 hours PRN Cough  dextrose Oral Gel 15 Gram(s) Oral once PRN Blood Glucose LESS THAN 70 milliGRAM(s)/deciliter  guaiFENesin Oral Liquid (Sugar-Free) 400 milliGRAM(s) Oral every 6 hours PRN Cough  hydrocodone/homatropine Syrup 5 milliLiter(s) Oral every 4 hours PRN Cough      CAPILLARY BLOOD GLUCOSE      POCT Blood Glucose.: 138 mg/dL (28 Jun 2023 08:28)  POCT Blood Glucose.: 135 mg/dL (27 Jun 2023 21:25)  POCT Blood Glucose.: 201 mg/dL (27 Jun 2023 17:46)  POCT Blood Glucose.: 213 mg/dL (27 Jun 2023 12:20)    I&O's Summary      PHYSICAL EXAM:  Vital Signs Last 24 Hrs  T(C): 37.5 (28 Jun 2023 05:31), Max: 39.6 (27 Jun 2023 18:08)  T(F): 99.5 (28 Jun 2023 05:31), Max: 103.2 (27 Jun 2023 18:08)  HR: 87 (28 Jun 2023 05:31) (84 - 92)  BP: 119/67 (28 Jun 2023 05:31) (104/61 - 129/70)  BP(mean): --  RR: 17 (28 Jun 2023 05:31) (17 - 19)  SpO2: 98% (28 Jun 2023 05:31) (95% - 98%)    Parameters below as of 28 Jun 2023 05:31  Patient On (Oxygen Delivery Method): room air        CONSTITUTIONAL: NAD, well-developed  HEET: MMM, EOMI, PERRLA  NECK: supple  RESPIRATORY: Normal respiratory effort; lungs are clear to auscultation bilaterally  CARDIOVASCULAR: Regular rate and rhythm, normal S1 and S2, no murmur/rub/gallop; No lower extremity edema; Peripheral pulses are 2+ bilaterally  ABDOMEN: Nontender to palpation, normoactive bowel sounds, no rebound/guarding; No hepatosplenomegaly  MUSCULOSKELETAL: 1+ edema LLE. no clubbing or cyanosis of digits; no joint swelling or tenderness to palpation  NEURO: Moving all four extremities, sensation grossly intact  PSYCH: A+O to person, place, and time; affect appropriate  SKIN: No rash    LABS:                        10.3   6.95  )-----------( 164      ( 28 Jun 2023 06:30 )             32.5     06-28    131<L>  |  94<L>  |  10  ----------------------------<  128<H>  4.2   |  26  |  1.21    Ca    9.1      28 Jun 2023 06:30  Phos  2.2     06-28  Mg     2.00     06-28            Urinalysis Basic - ( 28 Jun 2023 06:30 )    Color: x / Appearance: x / SG: x / pH: x  Gluc: 128 mg/dL / Ketone: x  / Bili: x / Urobili: x   Blood: x / Protein: x / Nitrite: x   Leuk Esterase: x / RBC: x / WBC x   Sq Epi: x / Non Sq Epi: x / Bacteria: x        Culture - Sputum (collected 27 Jun 2023 12:17)  Source: .Sputum Sputum  Gram Stain (27 Jun 2023 19:43):    Rare polymorphonuclear leukocytes per low power field    Rare Squamous epithelial cells per low power field    Rare Gram Negative Rods per oil power field    Rare Gram positive cocci in pairs per oil power field    Rare Yeast like cells per oil power field    Culture - Urine (collected 27 Jun 2023 02:30)  Source: Clean Catch Clean Catch (Midstream)  Final Report (28 Jun 2023 06:53):    No growth    Culture - Blood (collected 26 Jun 2023 04:40)  Source: .Blood Blood-Peripheral  Preliminary Report (27 Jun 2023 10:01):    No growth at 24 hours    Culture - Blood (collected 26 Jun 2023 04:20)  Source: .Blood Blood-Peripheral  Preliminary Report (27 Jun 2023 10:01):    No growth at 24 hours    ACC: [id number] EXAM:  CT CHEST IC   ORDERED BY: RANULFO MARTINEZ     ACC: [id number] EXAM:  CT ABDOMEN AND PELVIS IC   ORDERED BY: RANULFO MARTINEZ     PROCEDURE DATE:  06/27/2023          INTERPRETATION:  CLINICAL INFORMATION: Fever. Cough. Lethargy. History of   prostate cancer. Sepsis.    COMPARISON: 11/15/2021    CONTRAST/COMPLICATIONS:  IV Contrast: Omnipaque 350 90 cc administered   10 cc discarded  Oral Contrast: NONE  Complications: None reported at time of study completion    PROCEDURE:  CT of the Chest, Abdomen and Pelvis was performed.  Sagittal and coronal reformats were performed.    FINDINGS:  CHEST:  LUNGS AND LARGE AIRWAYS: Patent central airways. Ground glass opacity and   consolidation in the right lower lobe, concerning for pneumonia.   Pneumatocele in the lingula.  PLEURA: No pleural effusion.  VESSELS: Within normal limits.  HEART: Heart size is normal. No pericardial effusion.  MEDIASTINUM AND ABNER: Right hilar and mediastinal adenopathy. For   reference:  *  Right hilar lymph node measures 2.5 x 2.0 cm (2:83)  *  Subcarinal lymph node measures 2.6 x 1.4 cm (2:67)  CHEST WALL AND LOWER NECK: Within normal limits.    ABDOMEN AND PELVIS:  LIVER: Within normal limits.  BILE DUCTS: Normal caliber.  GALLBLADDER: Within normal limits.  SPLEEN: Within normal limits.  PANCREAS: Within normal limits.  ADRENALS: Within normal limits.  KIDNEYS/URETERS: Within normal limits.    BLADDER: Within normal limits.  REPRODUCTIVE ORGANS: Prostatectomy.    BOWEL: No bowel obstruction. Appendix is not visualized. Minimal   diverticulosis, without evidence of acute diverticulitis.  PERITONEUM: No ascites.  VESSELS: Mild atherosclerotic calcifications.  RETROPERITONEUM/LYMPH NODES: No lymphadenopathy.  ABDOMINAL WALL: Fat-containing umbilical hernia.  BONES: Degenerative changes.    IMPRESSION:  *  Right lower lobe consolidation and groundglass opacity, concerning for   infectious etiology. Follow-up to resolution is recommended.  *  Right hilar and mediastinal adenopathy, which may be reactive.   Attention on follow-up imaging.            Tele Reviewed:    RADIOLOGY & ADDITIONAL TESTS:  Results Reviewed:   Imaging Personally Reviewed:  Electrocardiogram Personally Reviewed:

## 2023-06-28 NOTE — PROVIDER CONTACT NOTE (OTHER) - BACKGROUND
Patient admitted with weakness. hx of prostate CA. diabetes, sleep apnea,
pt admitted for fevers
Fever
Patient admitted with fever
Patient admitted with fever
Patient admitted with weakness
Patient admitted with weakness
Patient admitted with fever
Patient admitted with fever

## 2023-06-28 NOTE — PROGRESS NOTE ADULT - ASSESSMENT
62 year old male, with history of diabetes and prostate cancer s/p prostatectomy (2020), complaining of lethargy that began 2 days ago (6/23). He was found to be tachycardic and febrile in the ED, although workup showed unremarkable labs, negative UA, negative RVP, and normal CXR, and admitted for Sepsis likely 2/2 to [facility] PNA .  62 year old male, with history of diabetes and prostate cancer s/p prostatectomy (2020), complaining of lethargy that began 6/23. He was found to be tachycardic and febrile in the ED, although workup showed unremarkable labs, negative UA, negative RVP, and normal CXR, and admitted for Sepsis likely 2/2 to [facility] PNA .

## 2023-06-29 LAB
ANION GAP SERPL CALC-SCNC: 3 MMOL/L — LOW (ref 7–14)
BUN SERPL-MCNC: 12 MG/DL — SIGNIFICANT CHANGE UP (ref 7–23)
CALCIUM SERPL-MCNC: 9.3 MG/DL — SIGNIFICANT CHANGE UP (ref 8.4–10.5)
CHLORIDE SERPL-SCNC: 97 MMOL/L — LOW (ref 98–107)
CO2 SERPL-SCNC: 30 MMOL/L — SIGNIFICANT CHANGE UP (ref 22–31)
CREAT SERPL-MCNC: 1.25 MG/DL — SIGNIFICANT CHANGE UP (ref 0.5–1.3)
CULTURE RESULTS: SIGNIFICANT CHANGE UP
EGFR: 65 ML/MIN/1.73M2 — SIGNIFICANT CHANGE UP
GLUCOSE BLDC GLUCOMTR-MCNC: 129 MG/DL — HIGH (ref 70–99)
GLUCOSE BLDC GLUCOMTR-MCNC: 131 MG/DL — HIGH (ref 70–99)
GLUCOSE BLDC GLUCOMTR-MCNC: 135 MG/DL — HIGH (ref 70–99)
GLUCOSE BLDC GLUCOMTR-MCNC: 149 MG/DL — HIGH (ref 70–99)
GLUCOSE SERPL-MCNC: 127 MG/DL — HIGH (ref 70–99)
HCT VFR BLD CALC: 32.8 % — LOW (ref 39–50)
HGB BLD-MCNC: 11 G/DL — LOW (ref 13–17)
MAGNESIUM SERPL-MCNC: 2.6 MG/DL — SIGNIFICANT CHANGE UP (ref 1.6–2.6)
MCHC RBC-ENTMCNC: 29.3 PG — SIGNIFICANT CHANGE UP (ref 27–34)
MCHC RBC-ENTMCNC: 33.5 GM/DL — SIGNIFICANT CHANGE UP (ref 32–36)
MCV RBC AUTO: 87.2 FL — SIGNIFICANT CHANGE UP (ref 80–100)
NRBC # BLD: 0 /100 WBCS — SIGNIFICANT CHANGE UP (ref 0–0)
NRBC # FLD: 0 K/UL — SIGNIFICANT CHANGE UP (ref 0–0)
PHOSPHATE SERPL-MCNC: 2.3 MG/DL — LOW (ref 2.5–4.5)
PLATELET # BLD AUTO: 200 K/UL — SIGNIFICANT CHANGE UP (ref 150–400)
POTASSIUM SERPL-MCNC: 4.2 MMOL/L — SIGNIFICANT CHANGE UP (ref 3.5–5.3)
POTASSIUM SERPL-SCNC: 4.2 MMOL/L — SIGNIFICANT CHANGE UP (ref 3.5–5.3)
RBC # BLD: 3.76 M/UL — LOW (ref 4.2–5.8)
RBC # FLD: 13.9 % — SIGNIFICANT CHANGE UP (ref 10.3–14.5)
SODIUM SERPL-SCNC: 130 MMOL/L — LOW (ref 135–145)
SPECIMEN SOURCE: SIGNIFICANT CHANGE UP
WBC # BLD: 6.14 K/UL — SIGNIFICANT CHANGE UP (ref 3.8–10.5)
WBC # FLD AUTO: 6.14 K/UL — SIGNIFICANT CHANGE UP (ref 3.8–10.5)

## 2023-06-29 PROCEDURE — 99233 SBSQ HOSP IP/OBS HIGH 50: CPT

## 2023-06-29 RX ORDER — GUAIFENESIN/DEXTROMETHORPHAN 600MG-30MG
10 TABLET, EXTENDED RELEASE 12 HR ORAL EVERY 6 HOURS
Refills: 0 | Status: DISCONTINUED | OUTPATIENT
Start: 2023-06-29 | End: 2023-07-02

## 2023-06-29 RX ORDER — IPRATROPIUM/ALBUTEROL SULFATE 18-103MCG
3 AEROSOL WITH ADAPTER (GRAM) INHALATION EVERY 6 HOURS
Refills: 0 | Status: DISCONTINUED | OUTPATIENT
Start: 2023-06-29 | End: 2023-07-02

## 2023-06-29 RX ADMIN — ENOXAPARIN SODIUM 40 MILLIGRAM(S): 100 INJECTION SUBCUTANEOUS at 18:49

## 2023-06-29 RX ADMIN — Medication 200 MILLIGRAM(S): at 14:01

## 2023-06-29 RX ADMIN — Medication 200 MILLIGRAM(S): at 21:14

## 2023-06-29 RX ADMIN — MEROPENEM 100 MILLIGRAM(S): 1 INJECTION INTRAVENOUS at 21:40

## 2023-06-29 RX ADMIN — Medication 3 MILLILITER(S): at 13:31

## 2023-06-29 RX ADMIN — Medication 3 MILLILITER(S): at 22:02

## 2023-06-29 RX ADMIN — MEROPENEM 100 MILLIGRAM(S): 1 INJECTION INTRAVENOUS at 13:06

## 2023-06-29 RX ADMIN — Medication 325 MILLIGRAM(S): at 13:06

## 2023-06-29 RX ADMIN — AZITHROMYCIN 255 MILLIGRAM(S): 500 TABLET, FILM COATED ORAL at 14:00

## 2023-06-29 RX ADMIN — Medication 200 MILLIGRAM(S): at 05:51

## 2023-06-29 RX ADMIN — MEROPENEM 100 MILLIGRAM(S): 1 INJECTION INTRAVENOUS at 05:43

## 2023-06-29 NOTE — PROGRESS NOTE ADULT - ATTENDING COMMENTS
Briefly a 62M with DM2 on Metformin and Ozempic, prostate CA s/p RT and prostatectomy in 2020 presents with lethargy, dry cough, fevers/chills since Friday.  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  Currently appears well, tolerating diet and nontoxic appearing.     No fevers since yesterday afternoon. Feeling a little better but still with significant cough, which gives him a headache.    Physical exam unchanged overall.  AAOx3, NAD  RRR, lungs CTAB  oral mucosa moist, without erythema, discharge, or swelling  abd benign, soft, NTND  5/5 strength all extremities  2+ pulses all extremities  b/l LE without pallor, edema, excess warmth to touch    #Sepsis due to RLL PNA  - had been persistently febrile on zosyn  - as seen on CT chest  - ID consulted, appreciate input  - MRSA PCR neg, off vancomycin  - switched from zosyn to meropenem  - added azithromycin  - F/U BCx and UCx - NGTD  - sputum Cx with multiple organisms, likely contaminated by oral phill  - f/u legionella resp Cx    #Persistent cough  - in setting of RLL PNA  - largely nonproductive  - guaifenesin, benzonatate, hycodan PRN  - incentive spirometry  - trial of duonebs    #Hilar and mediastinal adenopathy  - as seen on CT chest, likely reactive in setting of infection  - outpatient follow-up for resolution 1 month after discharge    Discussed with patient and his friend Rosanne at bedside (as requested by the patient).

## 2023-06-29 NOTE — PROGRESS NOTE ADULT - ASSESSMENT
62 year old male, with history of diabetes and prostate cancer s/p prostatectomy (2020), complaining of lethargy that began 6/23. He was found to be tachycardic and febrile in the ED, although workup showed unremarkable labs, negative UA, negative RVP, and normal CXR, and admitted for Sepsis likely 2/2 to [facility] PNA .

## 2023-06-29 NOTE — PROGRESS NOTE ADULT - PROBLEM SELECTOR PLAN 2
-started on hycodan  -started on tessalon and guanifesin -started on hycodan  -started on tessalon and guanifesin/dextromethorphan

## 2023-06-29 NOTE — PROGRESS NOTE ADULT - PROBLEM SELECTOR PLAN 1
-CT scan shows signs of RLL PNA  -Follow ID rec: meropenem 1g Q8 and azithromycin 500mg Q24    -f/u legionella sputum culture  -meets SIRS criteria with tachycardia and Tmax 103F  -received zosyn in the ED and on zosyn for empiric coverage   -f/u TB testing  -HIV negative -CT scan shows signs of RLL PNA  -Follow ID rec: meropenem 1g Q8 and azithromycin 500mg Q24    -f/u legionella sputum culture  -meets SIRS criteria with tachycardia and Tmax 103F  -f/u TB testing  -HIV negative

## 2023-06-29 NOTE — PROGRESS NOTE ADULT - ASSESSMENT
63 yo M with DM, prostate CA initially with lethargy  Fever, no leukocytosis    CT with RLL consolidation, LAD  urine legionella negative    BCXs NGTD  LFTs WNL  UA neg, UCX neg    HIV neg  Still fevers through zosyn, still fatigued/ill appearing today, escalate abx    Overall, PNA, fever, abnormal finding on imaging  - Zane 1g q 8  - sputum culture with nml resp phill  - Send sputum culture for legionella- continue azithromycin for now  - BCXs without growth todate  - Monitor for further fevers/for improvement  - Close monitoring resp status  - Any further workup for LAD per team

## 2023-06-29 NOTE — PROGRESS NOTE ADULT - SUBJECTIVE AND OBJECTIVE BOX
PROGRESS NOTE:   Authored by Dr. Juan Diego Briones MD (PGY-1). Pager [facility] [phone]/ [facility]     Patient is a 62y old  Male who presents with a chief complaint of Sepsis (28 Jun 2023 09:11)      SUBJECTIVE / OVERNIGHT EVENTS:  No acute events overnight. His lethargy is improving.    MEDICATIONS  (STANDING):  azithromycin  IVPB      azithromycin  IVPB 500 milliGRAM(s) IV Intermittent every 24 hours  dextrose 5%. 1000 milliLiter(s) (100 mL/Hr) IV Continuous <Continuous>  dextrose 5%. 1000 milliLiter(s) (50 mL/Hr) IV Continuous <Continuous>  dextrose 50% Injectable 25 Gram(s) IV Push once  dextrose 50% Injectable 12.5 Gram(s) IV Push once  dextrose 50% Injectable 25 Gram(s) IV Push once  enoxaparin Injectable 40 milliGRAM(s) SubCutaneous every 24 hours  ferrous    sulfate 325 milliGRAM(s) Oral daily  glucagon  Injectable 1 milliGRAM(s) IntraMuscular once  insulin lispro (ADMELOG) corrective regimen sliding scale   SubCutaneous three times a day before meals  insulin lispro (ADMELOG) corrective regimen sliding scale   SubCutaneous at bedtime  meropenem  IVPB 1000 milliGRAM(s) IV Intermittent every 8 hours  polyethylene glycol 3350 17 Gram(s) Oral daily  senna 2 Tablet(s) Oral at bedtime    MEDICATIONS  (PRN):  acetaminophen     Tablet .. 650 milliGRAM(s) Oral every 6 hours PRN Temp greater or equal to 38C (100.4F), Mild Pain (1 - 3)  benzonatate 200 milliGRAM(s) Oral every 8 hours PRN Cough  dextrose Oral Gel 15 Gram(s) Oral once PRN Blood Glucose LESS THAN 70 milliGRAM(s)/deciliter  guaiFENesin Oral Liquid (Sugar-Free) 400 milliGRAM(s) Oral every 6 hours PRN Cough  hydrocodone/homatropine Syrup 5 milliLiter(s) Oral every 4 hours PRN Cough      CAPILLARY BLOOD GLUCOSE      POCT Blood Glucose.: 131 mg/dL (29 Jun 2023 08:37)  POCT Blood Glucose.: 145 mg/dL (28 Jun 2023 21:15)  POCT Blood Glucose.: 114 mg/dL (28 Jun 2023 17:27)  POCT Blood Glucose.: 164 mg/dL (28 Jun 2023 12:31)    I&O's Summary      PHYSICAL EXAM:  Vital Signs Last 24 Hrs  T(C): 37.4 (29 Jun 2023 05:29), Max: 39.5 (28 Jun 2023 09:30)  T(F): 99.3 (29 Jun 2023 05:29), Max: 103.1 (28 Jun 2023 09:30)  HR: 78 (29 Jun 2023 05:29) (73 - 91)  BP: 127/65 (29 Jun 2023 05:29) (112/74 - 127/65)  BP(mean): --  RR: 17 (29 Jun 2023 05:29) (16 - 18)  SpO2: 100% (29 Jun 2023 05:29) (95% - 100%)    Parameters below as of 29 Jun 2023 05:29  Patient On (Oxygen Delivery Method): room air        CONSTITUTIONAL: NAD, well-developed  HEET: MMM, EOMI, PERRLA  NECK: supple  RESPIRATORY: Normal respiratory effort; lungs are clear to auscultation bilaterally  CARDIOVASCULAR: Regular rate and rhythm, normal S1 and S2, no murmur/rub/gallop; No lower extremity edema; Peripheral pulses are 2+ bilaterally  ABDOMEN: Nontender to palpation, normoactive bowel sounds, no rebound/guarding; No hepatosplenomegaly  MUSCULOSKELETAL: 1+ pitting edema LLE. no clubbing or cyanosis of digits; no joint swelling or tenderness to palpation  NEURO: Moving all four extremities, sensation grossly intact  PSYCH: A+O to person, place, and time; affect appropriate  SKIN: No rash    LABS:                        11.0   6.14  )-----------( 200      ( 29 Jun 2023 05:45 )             32.8     06-29    130<L>  |  97<L>  |  12  ----------------------------<  127<H>  4.2   |  30  |  1.25    Ca    9.3      29 Jun 2023 05:45  Phos  2.3     06-29  Mg     2.60     06-29            Urinalysis Basic - ( 29 Jun 2023 05:45 )    Color: x / Appearance: x / SG: x / pH: x  Gluc: 127 mg/dL / Ketone: x  / Bili: x / Urobili: x   Blood: x / Protein: x / Nitrite: x   Leuk Esterase: x / RBC: x / WBC x   Sq Epi: x / Non Sq Epi: x / Bacteria: x        Culture - Sputum (collected 27 Jun 2023 12:17)  Source: .Sputum Sputum  Gram Stain (27 Jun 2023 19:43):    Rare polymorphonuclear leukocytes per low power field    Rare Squamous epithelial cells per low power field    Rare Gram Negative Rods per oil power field    Rare Gram positive cocci in pairs per oil power field    Rare Yeast like cells per oil power field  Preliminary Report (28 Jun 2023 16:48):    Normal Respiratory Consuelo present    Culture - Urine (collected 27 Jun 2023 02:30)  Source: Clean Catch Clean Catch (Midstream)  Final Report (28 Jun 2023 06:53):    No growth        Tele Reviewed:    RADIOLOGY & ADDITIONAL TESTS:  Results Reviewed:   Imaging Personally Reviewed:  Electrocardiogram Personally Reviewed:

## 2023-06-29 NOTE — PROGRESS NOTE ADULT - SUBJECTIVE AND OBJECTIVE BOX
Follow Up:      Inverval History/ROS: Patient is a 62y old  Male who presents with a chief complaint of Sepsis (29 Jun 2023 09:10)    He has sob and cough  But feels better than yesterday  No fever in 24 hours      Allergies    No Known Allergies    Intolerances        ANTIMICROBIALS:  azithromycin  IVPB    azithromycin  IVPB 500 every 24 hours  meropenem  IVPB 1000 every 8 hours      OTHER MEDS:  acetaminophen     Tablet .. 650 milliGRAM(s) Oral every 6 hours PRN  albuterol/ipratropium for Nebulization 3 milliLiter(s) Nebulizer every 6 hours  benzonatate 200 milliGRAM(s) Oral every 8 hours PRN  dextrose 5%. 1000 milliLiter(s) IV Continuous <Continuous>  dextrose 5%. 1000 milliLiter(s) IV Continuous <Continuous>  dextrose 50% Injectable 25 Gram(s) IV Push once  dextrose 50% Injectable 12.5 Gram(s) IV Push once  dextrose 50% Injectable 25 Gram(s) IV Push once  dextrose Oral Gel 15 Gram(s) Oral once PRN  enoxaparin Injectable 40 milliGRAM(s) SubCutaneous every 24 hours  ferrous    sulfate 325 milliGRAM(s) Oral daily  glucagon  Injectable 1 milliGRAM(s) IntraMuscular once  guaifenesin/dextromethorphan Oral Liquid 10 milliLiter(s) Oral every 6 hours PRN  hydrocodone/homatropine Syrup 5 milliLiter(s) Oral every 4 hours PRN  insulin lispro (ADMELOG) corrective regimen sliding scale   SubCutaneous three times a day before meals  insulin lispro (ADMELOG) corrective regimen sliding scale   SubCutaneous at bedtime  polyethylene glycol 3350 17 Gram(s) Oral daily  senna 2 Tablet(s) Oral at bedtime      Vital Signs Last 24 Hrs  T(C): 37.1 (29 Jun 2023 13:30), Max: 37.9 (28 Jun 2023 21:10)  T(F): 98.8 (29 Jun 2023 13:30), Max: 100.3 (28 Jun 2023 21:10)  HR: 75 (29 Jun 2023 13:35) (73 - 91)  BP: 110/81 (29 Jun 2023 13:30) (110/81 - 127/65)  BP(mean): --  RR: 17 (29 Jun 2023 13:30) (17 - 18)  SpO2: 96% (29 Jun 2023 13:35) (95% - 100%)    Parameters below as of 29 Jun 2023 13:35  Patient On (Oxygen Delivery Method): room air        PHYSICAL EXAM:  General: [x ] non-toxic, room air  HEAD/EYES: [ ] PERRL [x ] white sclera [ ] icterus  ENT:  [ ] normal [ ] supple [ ] thrush [ ] pharyngeal exudate  Cardiovascular:   [ ] murmur [ x] normal [ ] PPM/AICD  Respiratory:  [x ]course BS  GI:  [x ] soft, non-tender, normal bowel sounds  :  [ ] gómez [ ] no CVA tenderness   Musculoskeletal:  [ ] no synovitis  Neurologic:  [ ] non-focal exam   Skin:  [x ] no rash  Lymph: [x ] no lymphadenopathy  Psychiatric:  [ ] appropriate affect [ ] alert & oriented  Lines:  [ x] no phlebitis [ ] central line                                11.0   6.14  )-----------( 200      ( 29 Jun 2023 05:45 )             32.8       06-29    130<L>  |  97<L>  |  12  ----------------------------<  127<H>  4.2   |  30  |  1.25    Ca    9.3      29 Jun 2023 05:45  Phos  2.3     06-29  Mg     2.60     06-29        Urinalysis Basic - ( 29 Jun 2023 05:45 )    Color: x / Appearance: x / SG: x / pH: x  Gluc: 127 mg/dL / Ketone: x  / Bili: x / Urobili: x   Blood: x / Protein: x / Nitrite: x   Leuk Esterase: x / RBC: x / WBC x   Sq Epi: x / Non Sq Epi: x / Bacteria: x        MICROBIOLOGY:Culture Results:   Normal Respiratory Consuelo present (06-27-23 @ 12:17)  Culture Results:   No growth (06-27-23 @ 02:30)  Culture Results:   No growth at 72 Hours (06-26-23 @ 04:40)  Culture Results:   No growth at 72 Hours (06-26-23 @ 04:20)  Culture Results:   No growth at 4 days (06-24-23 @ 23:40)  Culture Results:   No growth (06-24-23 @ 23:34)  Culture Results:   No growth at 4 days (06-24-23 @ 22:25)      RADIOLOGY:

## 2023-06-30 ENCOUNTER — TRANSCRIPTION ENCOUNTER (OUTPATIENT)
Age: 63
End: 2023-06-30

## 2023-06-30 DIAGNOSIS — A41.9 SEPSIS, UNSPECIFIED ORGANISM: ICD-10-CM

## 2023-06-30 LAB
ANION GAP SERPL CALC-SCNC: 6 MMOL/L — LOW (ref 7–14)
BUN SERPL-MCNC: 14 MG/DL — SIGNIFICANT CHANGE UP (ref 7–23)
CALCIUM SERPL-MCNC: 8.8 MG/DL — SIGNIFICANT CHANGE UP (ref 8.4–10.5)
CHLORIDE SERPL-SCNC: 98 MMOL/L — SIGNIFICANT CHANGE UP (ref 98–107)
CO2 SERPL-SCNC: 25 MMOL/L — SIGNIFICANT CHANGE UP (ref 22–31)
CREAT SERPL-MCNC: 1.08 MG/DL — SIGNIFICANT CHANGE UP (ref 0.5–1.3)
CULTURE RESULTS: SIGNIFICANT CHANGE UP
CULTURE RESULTS: SIGNIFICANT CHANGE UP
EGFR: 78 ML/MIN/1.73M2 — SIGNIFICANT CHANGE UP
GAMMA INTERFERON BACKGROUND BLD IA-ACNC: 0.86 IU/ML — SIGNIFICANT CHANGE UP
GLUCOSE BLDC GLUCOMTR-MCNC: 124 MG/DL — HIGH (ref 70–99)
GLUCOSE BLDC GLUCOMTR-MCNC: 149 MG/DL — HIGH (ref 70–99)
GLUCOSE BLDC GLUCOMTR-MCNC: 165 MG/DL — HIGH (ref 70–99)
GLUCOSE BLDC GLUCOMTR-MCNC: 171 MG/DL — HIGH (ref 70–99)
GLUCOSE SERPL-MCNC: 125 MG/DL — HIGH (ref 70–99)
HCT VFR BLD CALC: 28.1 % — LOW (ref 39–50)
HGB BLD-MCNC: 9.1 G/DL — LOW (ref 13–17)
M TB IFN-G BLD-IMP: NEGATIVE — SIGNIFICANT CHANGE UP
M TB IFN-G CD4+ BCKGRND COR BLD-ACNC: 0.02 IU/ML — SIGNIFICANT CHANGE UP
M TB IFN-G CD4+CD8+ BCKGRND COR BLD-ACNC: 0.04 IU/ML — SIGNIFICANT CHANGE UP
MAGNESIUM SERPL-MCNC: 2.4 MG/DL — SIGNIFICANT CHANGE UP (ref 1.6–2.6)
MCHC RBC-ENTMCNC: 28.3 PG — SIGNIFICANT CHANGE UP (ref 27–34)
MCHC RBC-ENTMCNC: 32.4 GM/DL — SIGNIFICANT CHANGE UP (ref 32–36)
MCV RBC AUTO: 87.5 FL — SIGNIFICANT CHANGE UP (ref 80–100)
NRBC # BLD: 0 /100 WBCS — SIGNIFICANT CHANGE UP (ref 0–0)
NRBC # FLD: 0 K/UL — SIGNIFICANT CHANGE UP (ref 0–0)
PHOSPHATE SERPL-MCNC: 2.4 MG/DL — LOW (ref 2.5–4.5)
PLATELET # BLD AUTO: 260 K/UL — SIGNIFICANT CHANGE UP (ref 150–400)
POTASSIUM SERPL-MCNC: 4.2 MMOL/L — SIGNIFICANT CHANGE UP (ref 3.5–5.3)
POTASSIUM SERPL-SCNC: 4.2 MMOL/L — SIGNIFICANT CHANGE UP (ref 3.5–5.3)
QUANT TB PLUS MITOGEN MINUS NIL: 5.57 IU/ML — SIGNIFICANT CHANGE UP
RBC # BLD: 3.21 M/UL — LOW (ref 4.2–5.8)
RBC # FLD: 14.4 % — SIGNIFICANT CHANGE UP (ref 10.3–14.5)
SODIUM SERPL-SCNC: 129 MMOL/L — LOW (ref 135–145)
SPECIMEN SOURCE: SIGNIFICANT CHANGE UP
SPECIMEN SOURCE: SIGNIFICANT CHANGE UP
WBC # BLD: 6.01 K/UL — SIGNIFICANT CHANGE UP (ref 3.8–10.5)
WBC # FLD AUTO: 6.01 K/UL — SIGNIFICANT CHANGE UP (ref 3.8–10.5)

## 2023-06-30 PROCEDURE — 99232 SBSQ HOSP IP/OBS MODERATE 35: CPT

## 2023-06-30 RX ADMIN — Medication 1: at 12:33

## 2023-06-30 RX ADMIN — POLYETHYLENE GLYCOL 3350 17 GRAM(S): 17 POWDER, FOR SOLUTION ORAL at 12:34

## 2023-06-30 RX ADMIN — Medication 3 MILLILITER(S): at 07:11

## 2023-06-30 RX ADMIN — Medication 325 MILLIGRAM(S): at 12:35

## 2023-06-30 RX ADMIN — Medication 3 MILLILITER(S): at 21:23

## 2023-06-30 RX ADMIN — Medication 3 MILLILITER(S): at 13:43

## 2023-06-30 RX ADMIN — MEROPENEM 100 MILLIGRAM(S): 1 INJECTION INTRAVENOUS at 12:41

## 2023-06-30 RX ADMIN — Medication 1: at 18:12

## 2023-06-30 RX ADMIN — Medication 3 MILLILITER(S): at 03:48

## 2023-06-30 RX ADMIN — ENOXAPARIN SODIUM 40 MILLIGRAM(S): 100 INJECTION SUBCUTANEOUS at 18:14

## 2023-06-30 RX ADMIN — AZITHROMYCIN 255 MILLIGRAM(S): 500 TABLET, FILM COATED ORAL at 12:41

## 2023-06-30 RX ADMIN — MEROPENEM 100 MILLIGRAM(S): 1 INJECTION INTRAVENOUS at 05:46

## 2023-06-30 RX ADMIN — MEROPENEM 100 MILLIGRAM(S): 1 INJECTION INTRAVENOUS at 22:33

## 2023-06-30 NOTE — DISCHARGE NOTE PROVIDER - CARE PROVIDER_API CALL
Eddie Kapadia  Gastroenterology  [address], [address]  [place] [address]  Phone: [phone]  Fax: [phone]  Established Patient  Follow Up Time: 2 weeks

## 2023-06-30 NOTE — PROGRESS NOTE ADULT - PROBLEM SELECTOR PLAN 1
-pt complain of lethargy  - found to be tachycardic and febrile on admission  -CT showed RLL PNA  -following ID rec meropenem 1q Q8 and azithromycin 500mg Q24 -pt complain of lethargy  - found to be tachycardic and febrile on admission  -CT showed RLL PNA  -following ID rec meropenem 1q Q8 and azithromycin 500mg Q24  -also as per ID, if pt continues to improve, dc meropenem and azithromycin and start levofloxacin 750mg PO daily for 5 day course (7/2-7/9)

## 2023-06-30 NOTE — PROGRESS NOTE ADULT - SUBJECTIVE AND OBJECTIVE BOX
Follow Up:      Inverval History/ROS:Patient is a 62y old  Male who presents with a chief complaint of Sepsis (29 Jun 2023 09:10)    No fever  Less SOB, less cough      Allergies    No Known Allergies    Intolerances        ANTIMICROBIALS:  azithromycin  IVPB    azithromycin  IVPB 500 every 24 hours  meropenem  IVPB 1000 every 8 hours      OTHER MEDS:  acetaminophen     Tablet .. 650 milliGRAM(s) Oral every 6 hours PRN  albuterol/ipratropium for Nebulization 3 milliLiter(s) Nebulizer every 6 hours  benzonatate 200 milliGRAM(s) Oral every 8 hours PRN  dextrose 5%. 1000 milliLiter(s) IV Continuous <Continuous>  dextrose 5%. 1000 milliLiter(s) IV Continuous <Continuous>  dextrose 50% Injectable 25 Gram(s) IV Push once  dextrose 50% Injectable 12.5 Gram(s) IV Push once  dextrose 50% Injectable 25 Gram(s) IV Push once  dextrose Oral Gel 15 Gram(s) Oral once PRN  enoxaparin Injectable 40 milliGRAM(s) SubCutaneous every 24 hours  ferrous    sulfate 325 milliGRAM(s) Oral daily  glucagon  Injectable 1 milliGRAM(s) IntraMuscular once  guaifenesin/dextromethorphan Oral Liquid 10 milliLiter(s) Oral every 6 hours PRN  hydrocodone/homatropine Syrup 5 milliLiter(s) Oral every 4 hours PRN  insulin lispro (ADMELOG) corrective regimen sliding scale   SubCutaneous three times a day before meals  insulin lispro (ADMELOG) corrective regimen sliding scale   SubCutaneous at bedtime  polyethylene glycol 3350 17 Gram(s) Oral daily  senna 2 Tablet(s) Oral at bedtime      Vital Signs Last 24 Hrs  T(C): 36.7 (30 Jun 2023 05:36), Max: 37.6 (30 Jun 2023 02:00)  T(F): 98 (30 Jun 2023 05:36), Max: 99.7 (30 Jun 2023 02:00)  HR: 74 (30 Jun 2023 05:36) (73 - 85)  BP: 107/67 (30 Jun 2023 05:36) (106/62 - 115/70)  BP(mean): --  RR: 17 (30 Jun 2023 05:36) (16 - 17)  SpO2: 96% (30 Jun 2023 05:36) (93% - 98%)    Parameters below as of 30 Jun 2023 05:36  Patient On (Oxygen Delivery Method): room air        PHYSICAL EXAM:  General: [ ] non-toxic  HEAD/EYES: [ ] PERRL [x ] white sclera [ ] icterus  ENT:  [x ] normal [ ] supple [ ] thrush [ ] pharyngeal exudate  Cardiovascular:   [ ] murmur [x ] normal [ ] PPM/AICD  Respiratory:  [x ] crackles right base  GI:  [ x] soft, non-tender, normal bowel sounds  :  [ ] gómez [ ] no CVA tenderness   Musculoskeletal:  x[ ] no synovitis  Neurologic:  [ ] non-focal exam   Skin:  [ x] no rash  Lymph: [ ] no lymphadenopathy  Psychiatric:  [ ] appropriate affect x] alert & oriented  Lines:  [x ] no phlebitis [ ] central line                                9.1    6.01  )-----------( 260      ( 30 Jun 2023 06:20 )             28.1       06-30    129<L>  |  98  |  14  ----------------------------<  125<H>  4.2   |  25  |  1.08    Ca    8.8      30 Jun 2023 06:20  Phos  2.4     06-30  Mg     2.40     06-30        Urinalysis Basic - ( 30 Jun 2023 06:20 )    Color: x / Appearance: x / SG: x / pH: x  Gluc: 125 mg/dL / Ketone: x  / Bili: x / Urobili: x   Blood: x / Protein: x / Nitrite: x   Leuk Esterase: x / RBC: x / WBC x   Sq Epi: x / Non Sq Epi: x / Bacteria: x        MICROBIOLOGY:Culture Results:   Normal Respiratory Consuelo present (06-27-23 @ 12:17)  Culture Results:   No growth (06-27-23 @ 02:30)  Culture Results:   No growth at 4 days (06-26-23 @ 04:40)  Culture Results:   No growth at 4 days (06-26-23 @ 04:20)  Culture Results:   No growth at 5 days (06-24-23 @ 23:40)  Culture Results:   No growth (06-24-23 @ 23:34)  Culture Results:   No growth at 5 days (06-24-23 @ 22:25)      RADIOLOGY:

## 2023-06-30 NOTE — DISCHARGE NOTE PROVIDER - HOSPITAL COURSE
62 year old male with pmhx of diabetes and prostate cancer s/p radiation and prostatectomy (2020) came to the hospital complaining of lethargy since 6/23. He cannot identify any inciting factors and has not felt this lethargic before. He did not try any medications to improve his lethargy. He also experienced chills, cough, and subjective fevers during this time period. He denies recent changes in his weight, night sweats, shortness of breath, and changes in bowel and bladder habits. In the hospital, he was started on vanc and zosyn, which did not improve his symptoms and he was still spiking fevers (Tmax 103F). As per ID recs, he was started on azithromycin 500mg Q24 and meropenem 1g Q8. After this regimen, the patient's status was improving.    HPI      63 y/o M with PMHx of T2DM and prostate cancer s/p radiation and prostatectomy (2020 who presents with lethargy and dry cough for 2 days . He cannot identify any inciting factors and has not felt this lethargic before. He did not try any medications to improve his lethargy. Also endorses and subjective fevers during this time period. He denies recent changes in his weight, night sweats, shortness of breath, and changes in bowel and bladder habits.     In the ED, patient was given tylenol, metoclopramide, and fluids. He was also found to be tachycardic and febrile (Tmax 103F)    Pt was admitted for fever and started on empiric vancomycin and Zosyn. CXR on admission showed clear lungs. Blood cultures were negative. Pt continued to spike fevers despite 48 hours on broad spectrum antibiotics so infectious disease was consulted and CT Chest, Abdomen, and Pelvis were performed. CT Chest, Abdomen and Pelvis were remarkable for a RLL lung consolidation and GGO, no evidence of metastasis. Sputum cultures and legionella culture were negative. Pt was switched to meropenem per infectious disease and subsequently defervesced. Pt is stable for discharge home. He is being sent home with 5 days of Levaquin to complete his antibiotic course. He should follow up with his primary care provider within 1 week.

## 2023-06-30 NOTE — DISCHARGE NOTE PROVIDER - NSDCCPTREATMENT_GEN_ALL_CORE_FT
PRINCIPAL PROCEDURE  Procedure: CT of chest and abdomen with intravenous contrast  Findings and Treatment: INTERPRETATION:  CLINICAL INFORMATION: Fever. Cough. Lethargy. History of   prostate cancer. Sepsis.  COMPARISON: 11/15/2021  CONTRAST/COMPLICATIONS:  IV Contrast: Omnipaque 350 90 cc administered   10 cc discarded  Oral Contrast: NONE  Complications: None reported at time of study completion  PROCEDURE:  CT of the Chest, Abdomen and Pelvis was performed.  Sagittal and coronal reformats were performed.  FINDINGS:  CHEST:  LUNGS AND LARGE AIRWAYS: Patent central airways. Ground glass opacity and   consolidation in the right lower lobe, concerning for pneumonia.   Pneumatocele in the lingula.  PLEURA: No pleural effusion.  VESSELS: Within normal limits.  HEART: Heart size is normal. No pericardial effusion.  MEDIASTINUM AND ABNER: Right hilar and mediastinal adenopathy. For   reference:  *  Right hilar lymph node measures 2.5 x 2.0 cm (2:83)  *  Subcarinal lymph node measures 2.6 x 1.4 cm (2:67)  CHEST WALL AND LOWER NECK: Within normal limits.  ABDOMEN AND PELVIS:  LIVER: Within normal limits.  BILE DUCTS: Normal caliber.  GALLBLADDER: Within normal limits.  SPLEEN: Within normal limits.  PANCREAS: Within normal limits.  ADRENALS: Within normal limits.  KIDNEYS/URETERS: Within normal limits.  BLADDER: Within normal limits.  REPRODUCTIVE ORGANS: Prostatectomy.  BOWEL: No bowel obstruction. Appendix is not visualized. Minimal   diverticulosis, without evidence of acute diverticulitis.  PERITONEUM: No ascites.  VESSELS: Mild atherosclerotic calcifications.  RETROPERITONEUM/LYMPH NODES: No lymphadenopathy.  ABDOMINAL WALL: Fat-containing umbilical hernia.  BONES: Degenerative changes.  IMPRESSION:  *  Right lower lobe consolidation and groundglass opacity, concerning for   infectious etiology. Follow-up to resolution is recommended.  *  Right hilar and mediastinal adenopathy, which may be reactive.   Attention on follow-up imaging.  --- End of Report ---

## 2023-06-30 NOTE — PROGRESS NOTE ADULT - SUBJECTIVE AND OBJECTIVE BOX
PROGRESS NOTE:   Authored by Dr. Juan Diego Briones MD (PGY-1). Pager [facility] [phone]/ [facility]     Patient is a 62y old  Male who presents with a chief complaint of Sepsis (29 Jun 2023 09:10)      SUBJECTIVE / OVERNIGHT EVENTS:  No acute events overnight.     MEDICATIONS  (STANDING):  albuterol/ipratropium for Nebulization 3 milliLiter(s) Nebulizer every 6 hours  azithromycin  IVPB      azithromycin  IVPB 500 milliGRAM(s) IV Intermittent every 24 hours  dextrose 5%. 1000 milliLiter(s) (100 mL/Hr) IV Continuous <Continuous>  dextrose 5%. 1000 milliLiter(s) (50 mL/Hr) IV Continuous <Continuous>  dextrose 50% Injectable 25 Gram(s) IV Push once  dextrose 50% Injectable 12.5 Gram(s) IV Push once  dextrose 50% Injectable 25 Gram(s) IV Push once  enoxaparin Injectable 40 milliGRAM(s) SubCutaneous every 24 hours  ferrous    sulfate 325 milliGRAM(s) Oral daily  glucagon  Injectable 1 milliGRAM(s) IntraMuscular once  insulin lispro (ADMELOG) corrective regimen sliding scale   SubCutaneous three times a day before meals  insulin lispro (ADMELOG) corrective regimen sliding scale   SubCutaneous at bedtime  meropenem  IVPB 1000 milliGRAM(s) IV Intermittent every 8 hours  polyethylene glycol 3350 17 Gram(s) Oral daily  senna 2 Tablet(s) Oral at bedtime    MEDICATIONS  (PRN):  acetaminophen     Tablet .. 650 milliGRAM(s) Oral every 6 hours PRN Temp greater or equal to 38C (100.4F), Mild Pain (1 - 3)  benzonatate 200 milliGRAM(s) Oral every 8 hours PRN Cough  dextrose Oral Gel 15 Gram(s) Oral once PRN Blood Glucose LESS THAN 70 milliGRAM(s)/deciliter  guaifenesin/dextromethorphan Oral Liquid 10 milliLiter(s) Oral every 6 hours PRN Cough  hydrocodone/homatropine Syrup 5 milliLiter(s) Oral every 4 hours PRN Cough      CAPILLARY BLOOD GLUCOSE      POCT Blood Glucose.: 124 mg/dL (30 Jun 2023 08:30)  POCT Blood Glucose.: 135 mg/dL (29 Jun 2023 21:15)  POCT Blood Glucose.: 149 mg/dL (29 Jun 2023 17:53)  POCT Blood Glucose.: 129 mg/dL (29 Jun 2023 12:35)    I&O's Summary      PHYSICAL EXAM:  Vital Signs Last 24 Hrs  T(C): 36.7 (30 Jun 2023 05:36), Max: 37.6 (30 Jun 2023 02:00)  T(F): 98 (30 Jun 2023 05:36), Max: 99.7 (30 Jun 2023 02:00)  HR: 74 (30 Jun 2023 05:36) (73 - 85)  BP: 107/67 (30 Jun 2023 05:36) (106/62 - 115/70)  BP(mean): --  RR: 17 (30 Jun 2023 05:36) (16 - 17)  SpO2: 96% (30 Jun 2023 05:36) (93% - 98%)    Parameters below as of 30 Jun 2023 05:36  Patient On (Oxygen Delivery Method): room air        CONSTITUTIONAL: NAD, well-developed  HEET: MMM, EOMI, PERRLA  NECK: supple  RESPIRATORY: Normal respiratory effort; lungs are clear to auscultation bilaterally  CARDIOVASCULAR: Regular rate and rhythm, normal S1 and S2, no murmur/rub/gallop; No lower extremity edema; Peripheral pulses are 2+ bilaterally  ABDOMEN: Nontender to palpation, normoactive bowel sounds, no rebound/guarding; No hepatosplenomegaly  MUSCULOSKELETAL: no clubbing or cyanosis of digits; no joint swelling or tenderness to palpation  NEURO: Moving all four extremities, sensation grossly intact  PSYCH: A+O to person, place, and time; affect appropriate  SKIN: No rash    LABS:                        9.1    6.01  )-----------( 260      ( 30 Jun 2023 06:20 )             28.1     06-30    129<L>  |  98  |  14  ----------------------------<  125<H>  4.2   |  25  |  1.08    Ca    8.8      30 Jun 2023 06:20  Phos  2.4     06-30  Mg     2.40     06-30            Urinalysis Basic - ( 30 Jun 2023 06:20 )    Color: x / Appearance: x / SG: x / pH: x  Gluc: 125 mg/dL / Ketone: x  / Bili: x / Urobili: x   Blood: x / Protein: x / Nitrite: x   Leuk Esterase: x / RBC: x / WBC x   Sq Epi: x / Non Sq Epi: x / Bacteria: x        Culture - Sputum (collected 27 Jun 2023 12:17)  Source: .Sputum Sputum  Gram Stain (27 Jun 2023 19:43):    Rare polymorphonuclear leukocytes per low power field    Rare Squamous epithelial cells per low power field    Rare Gram Negative Rods per oil power field    Rare Gram positive cocci in pairs per oil power field    Rare Yeast like cells per oil power field  Final Report (29 Jun 2023 18:45):    Normal Respiratory Consuelo present        Tele Reviewed:    RADIOLOGY & ADDITIONAL TESTS:  Results Reviewed:   Imaging Personally Reviewed:  Electrocardiogram Personally Reviewed:     PROGRESS NOTE:   Authored by Dr. Juan Diego Briones MD (PGY-1). Pager [facility] [phone]/ [facility]     Patient is a 62y old  Male who presents with a chief complaint of Sepsis (29 Jun 2023 09:10)      SUBJECTIVE / OVERNIGHT EVENTS:  No acute events overnight. He is feeling better this morning.     MEDICATIONS  (STANDING):  albuterol/ipratropium for Nebulization 3 milliLiter(s) Nebulizer every 6 hours  azithromycin  IVPB      azithromycin  IVPB 500 milliGRAM(s) IV Intermittent every 24 hours  dextrose 5%. 1000 milliLiter(s) (100 mL/Hr) IV Continuous <Continuous>  dextrose 5%. 1000 milliLiter(s) (50 mL/Hr) IV Continuous <Continuous>  dextrose 50% Injectable 25 Gram(s) IV Push once  dextrose 50% Injectable 12.5 Gram(s) IV Push once  dextrose 50% Injectable 25 Gram(s) IV Push once  enoxaparin Injectable 40 milliGRAM(s) SubCutaneous every 24 hours  ferrous    sulfate 325 milliGRAM(s) Oral daily  glucagon  Injectable 1 milliGRAM(s) IntraMuscular once  insulin lispro (ADMELOG) corrective regimen sliding scale   SubCutaneous three times a day before meals  insulin lispro (ADMELOG) corrective regimen sliding scale   SubCutaneous at bedtime  meropenem  IVPB 1000 milliGRAM(s) IV Intermittent every 8 hours  polyethylene glycol 3350 17 Gram(s) Oral daily  senna 2 Tablet(s) Oral at bedtime    MEDICATIONS  (PRN):  acetaminophen     Tablet .. 650 milliGRAM(s) Oral every 6 hours PRN Temp greater or equal to 38C (100.4F), Mild Pain (1 - 3)  benzonatate 200 milliGRAM(s) Oral every 8 hours PRN Cough  dextrose Oral Gel 15 Gram(s) Oral once PRN Blood Glucose LESS THAN 70 milliGRAM(s)/deciliter  guaifenesin/dextromethorphan Oral Liquid 10 milliLiter(s) Oral every 6 hours PRN Cough  hydrocodone/homatropine Syrup 5 milliLiter(s) Oral every 4 hours PRN Cough      CAPILLARY BLOOD GLUCOSE      POCT Blood Glucose.: 124 mg/dL (30 Jun 2023 08:30)  POCT Blood Glucose.: 135 mg/dL (29 Jun 2023 21:15)  POCT Blood Glucose.: 149 mg/dL (29 Jun 2023 17:53)  POCT Blood Glucose.: 129 mg/dL (29 Jun 2023 12:35)    I&O's Summary      PHYSICAL EXAM:  Vital Signs Last 24 Hrs  T(C): 36.7 (30 Jun 2023 05:36), Max: 37.6 (30 Jun 2023 02:00)  T(F): 98 (30 Jun 2023 05:36), Max: 99.7 (30 Jun 2023 02:00)  HR: 74 (30 Jun 2023 05:36) (73 - 85)  BP: 107/67 (30 Jun 2023 05:36) (106/62 - 115/70)  BP(mean): --  RR: 17 (30 Jun 2023 05:36) (16 - 17)  SpO2: 96% (30 Jun 2023 05:36) (93% - 98%)    Parameters below as of 30 Jun 2023 05:36  Patient On (Oxygen Delivery Method): room air        CONSTITUTIONAL: NAD, well-developed  HEET: MMM, EOMI, PERRLA  NECK: supple  RESPIRATORY: Normal respiratory effort; lungs are clear to auscultation bilaterally  CARDIOVASCULAR: Regular rate and rhythm, normal S1 and S2, no murmur/rub/gallop; No lower extremity edema; Peripheral pulses are 2+ bilaterally  ABDOMEN: Nontender to palpation, normoactive bowel sounds, no rebound/guarding; No hepatosplenomegaly  MUSCULOSKELETAL: 1+ edema LLE  no clubbing or cyanosis of digits; no joint swelling or tenderness to palpation  NEURO: Moving all four extremities, sensation grossly intact  PSYCH: A+O to person, place, and time; affect appropriate  SKIN: No rash    LABS:                        9.1    6.01  )-----------( 260      ( 30 Jun 2023 06:20 )             28.1     06-30    129<L>  |  98  |  14  ----------------------------<  125<H>  4.2   |  25  |  1.08    Ca    8.8      30 Jun 2023 06:20  Phos  2.4     06-30  Mg     2.40     06-30            Urinalysis Basic - ( 30 Jun 2023 06:20 )    Color: x / Appearance: x / SG: x / pH: x  Gluc: 125 mg/dL / Ketone: x  / Bili: x / Urobili: x   Blood: x / Protein: x / Nitrite: x   Leuk Esterase: x / RBC: x / WBC x   Sq Epi: x / Non Sq Epi: x / Bacteria: x        Culture - Sputum (collected 27 Jun 2023 12:17)  Source: .Sputum Sputum  Gram Stain (27 Jun 2023 19:43):    Rare polymorphonuclear leukocytes per low power field    Rare Squamous epithelial cells per low power field    Rare Gram Negative Rods per oil power field    Rare Gram positive cocci in pairs per oil power field    Rare Yeast like cells per oil power field  Final Report (29 Jun 2023 18:45):    Normal Respiratory Consuelo present        Tele Reviewed:    RADIOLOGY & ADDITIONAL TESTS:  Results Reviewed:   Imaging Personally Reviewed:  Electrocardiogram Personally Reviewed:

## 2023-06-30 NOTE — PROGRESS NOTE ADULT - PROBLEM SELECTOR PLAN 2
-started on hycodan  -started on tessalon and guanifesin/dextromethorphan on hycodan (5 mL PO Q4)   on tessalon (200 mg PO Q8) and guanifesin/dextromethorphan (10 mL PO Q6)

## 2023-06-30 NOTE — PROGRESS NOTE ADULT - ATTENDING COMMENTS
Briefly a 62M with DM2 on Metformin and Ozempic, prostate CA s/p RT and prostatectomy in 2020 presents with lethargy, dry cough, fevers/chills since Friday.  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  Currently appears well, tolerating diet and nontoxic appearing.     No fevers for 2 days now. Feeling better, cough also somewhat improved but not significantly.     Physical exam unchanged overall.  AAOx3, NAD  RRR, lungs CTAB  oral mucosa moist, without erythema, discharge, or swelling  abd benign, soft, NTND  5/5 strength all extremities  2+ pulses all extremities  b/l LE without pallor, edema, excess warmth to touch    #Sepsis due to RLL PNA  - had been persistently febrile on zosyn  - as seen on CT chest  - ID consulted, appreciate input  - MRSA PCR neg, off vancomycin  - switched from zosyn to meropenem  - added azithromycin  - if continuing to improve by 7/2, will switch to levofloxacin for 5 additional days of treatment  - F/U BCx and UCx - NGTD  - sputum Cx with multiple organisms, likely contaminated by oral phill  - f/u legionella resp Cx    #Persistent cough  - in setting of RLL PNA  - largely nonproductive  - guaifenesin, benzonatate, hycodan PRN  - incentive spirometry  - trial of duonebs    #Hilar and mediastinal adenopathy  - as seen on CT chest, likely reactive in setting of infection  - outpatient follow-up for resolution 1 month after discharge    Anticipate d/c Sunday if still improving, to complete additional levofloxacin 5 days after discharge.

## 2023-06-30 NOTE — DISCHARGE NOTE PROVIDER - NSDCMRMEDTOKEN_GEN_ALL_CORE_FT
metFORMIN 1000 mg oral tablet: 1 orally 2 times a day  Ozempic 2 mg/1.5 mL (0.25 mg or 0.5 mg dose) subcutaneous solution: 0.5 subcutaneously once a week on mondays   benzonatate 200 mg oral capsule: 1 cap(s) orally 3 times a day as needed for  cough  ferrous sulfate 325 mg (65 mg elemental iron) oral tablet: 1 tab(s) orally once a day  levoFLOXacin 750 mg oral tablet: 1 tab(s) orally once a day  metFORMIN 1000 mg oral tablet: 1 orally 2 times a day  Ozempic 2 mg/1.5 mL (0.25 mg or 0.5 mg dose) subcutaneous solution: 0.5 subcutaneously once a week on mondays

## 2023-06-30 NOTE — DISCHARGE NOTE PROVIDER - NSDCCPCAREPLAN_GEN_ALL_CORE_FT
PRINCIPAL DISCHARGE DIAGNOSIS  Diagnosis: Sepsis  Assessment and Plan of Treatment: You came to the hospital feeling lethargic and were experiencing fevers. A CT scan was performed that showed you had a pneumonia, which is an infection of the lungs. You were treated with two antibiotics (meropenem and azithromycin), which improved your symptoms. Be sure to repeat a ct scan to see if the pneumonia has been treated.      SECONDARY DISCHARGE DIAGNOSES  Diagnosis: Coughing  Assessment and Plan of Treatment: You were also experiencing bouts of coughing, which was likely due to your pneumoina. For this, you were given a cough suppressant and robitussin.    Diagnosis: Fever  Assessment and Plan of Treatment:      PRINCIPAL DISCHARGE DIAGNOSIS  Diagnosis: Sepsis  Assessment and Plan of Treatment: You came to the hospital feeling lethargic and were experiencing fevers. A CT scan was performed that showed you had a pneumonia, which is an infection of the lungs. You were treated with two antibiotics (meropenem and azithromycin), which improved your symptoms. You are being discharged with an additional 5 days of an antibiotic known as Levaquin. It is very important that you finish the antibiotics to adequately treat your infection. Please follow up with your primary care provider within 2 weeks. If you fevers return, or you develop difficulty brathing you should return to the hospital.      SECONDARY DISCHARGE DIAGNOSES  Diagnosis: Normocytic anemia  Assessment and Plan of Treatment: Your blood work showed that you are anemic, meaning that you have a low hemoglobin levels. Your iron levels were checked and were low. Your anemia is due to low iron levels. You were started on a daily iron pill which you should continue to take at home. This pill can cause constipation. If you become constipated, you can take over the counter stool softeners such as Miralax. Please follow up with your primary care provider for monitoring of your hemoglobin levels and for referral for a screening colonoscopy

## 2023-06-30 NOTE — PROGRESS NOTE ADULT - TIME BILLING
Time-based billing (NON-critical care).     50 minutes spent on total encounter; more than 50% of the visit was spent counseling and / or coordinating care by the attending physician.  The necessity of the time spent during the encounter on this date of service was due to:     review of laboratory data, radiology results, consultants' recommendations, documentation in [place], discussion with patient and interdisciplinary staff (such as , social workers, etc). Interventions were performed as documented above.
Time-based billing (NON-critical care).     35 minutes spent on total encounter; more than 50% of the visit was spent counseling and / or coordinating care by the attending physician.  The necessity of the time spent during the encounter on this date of service was due to:     review of laboratory data, radiology results, consultants' recommendations, documentation in [place], discussion with patient and interdisciplinary staff (such as , social workers, etc). Interventions were performed as documented above.
Time-based billing (NON-critical care).     50 minutes spent on total encounter; more than 50% of the visit was spent counseling and / or coordinating care by the attending physician.  The necessity of the time spent during the encounter on this date of service was due to:     review of laboratory data, radiology results, consultants' recommendations, documentation in [place], discussion with patient and interdisciplinary staff (such as , social workers, etc). Interventions were performed as documented above.
Time-based billing (NON-critical care).     50 minutes spent on total encounter; more than 50% of the visit was spent counseling and / or coordinating care by the attending physician.  The necessity of the time spent during the encounter on this date of service was due to:     review of laboratory data, radiology results, consultants' recommendations, documentation in [place], discussion with patient and interdisciplinary staff (such as , social workers, nursing manager, etc). Interventions were performed as documented above.

## 2023-06-30 NOTE — PROGRESS NOTE ADULT - ASSESSMENT
63 yo M with DM, prostate CA initially with lethargy  Fever, no leukocytosis    CT with RLL consolidation, LAD  urine legionella negative    BCXs NGTD  LFTs WNL  UA neg, UCX neg    HIV neg  Slowly improving- less cough, less sob    Overall, PNA, fever, abnormal finding on imaging    Day 5 of IV antibiotics with meropenen/ azithromycin  - sputum culture with nml resp phill  - Send sputum culture for legionella- continue azithromycin for now  - BCXs without growth to date    - Monitor for further fevers/for improvement  - Close monitoring resp status  - If he continues to improve, can change to levofloxacin 750 mg po daily on 7/2 for 5 more days    -Repeat Chest CT as outpt with his pmd in 8 weeks    Call ID service with questions        - Any further workup for LAD per team

## 2023-06-30 NOTE — PROGRESS NOTE ADULT - ASSESSMENT
62 year old male, with history of diabetes and prostate cancer s/p prostatectomy (2020), complaining of lethargy that began 6/23. He was found to be tachycardic and febrile in the ED, although workup showed unremarkable labs, negative UA, negative RVP, and normal CXR, and admitted for Sepsis likely 2/2 to [facility] .

## 2023-07-01 LAB
ANION GAP SERPL CALC-SCNC: 11 MMOL/L — SIGNIFICANT CHANGE UP (ref 7–14)
BUN SERPL-MCNC: 12 MG/DL — SIGNIFICANT CHANGE UP (ref 7–23)
CALCIUM SERPL-MCNC: 9.5 MG/DL — SIGNIFICANT CHANGE UP (ref 8.4–10.5)
CHLORIDE SERPL-SCNC: 98 MMOL/L — SIGNIFICANT CHANGE UP (ref 98–107)
CO2 SERPL-SCNC: 27 MMOL/L — SIGNIFICANT CHANGE UP (ref 22–31)
CREAT SERPL-MCNC: 0.92 MG/DL — SIGNIFICANT CHANGE UP (ref 0.5–1.3)
CULTURE RESULTS: SIGNIFICANT CHANGE UP
CULTURE RESULTS: SIGNIFICANT CHANGE UP
EGFR: 94 ML/MIN/1.73M2 — SIGNIFICANT CHANGE UP
GLUCOSE BLDC GLUCOMTR-MCNC: 131 MG/DL — HIGH (ref 70–99)
GLUCOSE BLDC GLUCOMTR-MCNC: 148 MG/DL — HIGH (ref 70–99)
GLUCOSE BLDC GLUCOMTR-MCNC: 159 MG/DL — HIGH (ref 70–99)
GLUCOSE BLDC GLUCOMTR-MCNC: 177 MG/DL — HIGH (ref 70–99)
GLUCOSE SERPL-MCNC: 155 MG/DL — HIGH (ref 70–99)
HCT VFR BLD CALC: 30.3 % — LOW (ref 39–50)
HGB BLD-MCNC: 9.6 G/DL — LOW (ref 13–17)
MAGNESIUM SERPL-MCNC: 2.4 MG/DL — SIGNIFICANT CHANGE UP (ref 1.6–2.6)
MCHC RBC-ENTMCNC: 27.8 PG — SIGNIFICANT CHANGE UP (ref 27–34)
MCHC RBC-ENTMCNC: 31.7 GM/DL — LOW (ref 32–36)
MCV RBC AUTO: 87.8 FL — SIGNIFICANT CHANGE UP (ref 80–100)
NRBC # BLD: 0 /100 WBCS — SIGNIFICANT CHANGE UP (ref 0–0)
NRBC # FLD: 0.02 K/UL — HIGH (ref 0–0)
PHOSPHATE SERPL-MCNC: 3.3 MG/DL — SIGNIFICANT CHANGE UP (ref 2.5–4.5)
PLATELET # BLD AUTO: 335 K/UL — SIGNIFICANT CHANGE UP (ref 150–400)
POTASSIUM SERPL-MCNC: 4.4 MMOL/L — SIGNIFICANT CHANGE UP (ref 3.5–5.3)
POTASSIUM SERPL-SCNC: 4.4 MMOL/L — SIGNIFICANT CHANGE UP (ref 3.5–5.3)
RBC # BLD: 3.45 M/UL — LOW (ref 4.2–5.8)
RBC # FLD: 14.6 % — HIGH (ref 10.3–14.5)
SODIUM SERPL-SCNC: 136 MMOL/L — SIGNIFICANT CHANGE UP (ref 135–145)
SPECIMEN SOURCE: SIGNIFICANT CHANGE UP
SPECIMEN SOURCE: SIGNIFICANT CHANGE UP
WBC # BLD: 5.46 K/UL — SIGNIFICANT CHANGE UP (ref 3.8–10.5)
WBC # FLD AUTO: 5.46 K/UL — SIGNIFICANT CHANGE UP (ref 3.8–10.5)

## 2023-07-01 PROCEDURE — 99232 SBSQ HOSP IP/OBS MODERATE 35: CPT | Mod: GC

## 2023-07-01 RX ADMIN — MEROPENEM 100 MILLIGRAM(S): 1 INJECTION INTRAVENOUS at 22:04

## 2023-07-01 RX ADMIN — Medication 1: at 12:30

## 2023-07-01 RX ADMIN — Medication 3 MILLILITER(S): at 10:47

## 2023-07-01 RX ADMIN — AZITHROMYCIN 255 MILLIGRAM(S): 500 TABLET, FILM COATED ORAL at 14:40

## 2023-07-01 RX ADMIN — Medication 325 MILLIGRAM(S): at 12:29

## 2023-07-01 RX ADMIN — Medication 1: at 08:42

## 2023-07-01 RX ADMIN — MEROPENEM 100 MILLIGRAM(S): 1 INJECTION INTRAVENOUS at 14:39

## 2023-07-01 RX ADMIN — Medication 3 MILLILITER(S): at 16:45

## 2023-07-01 RX ADMIN — MEROPENEM 100 MILLIGRAM(S): 1 INJECTION INTRAVENOUS at 05:28

## 2023-07-01 RX ADMIN — Medication 3 MILLILITER(S): at 21:52

## 2023-07-01 NOTE — PROGRESS NOTE ADULT - ASSESSMENT
62 year old male, with history of diabetes and prostate cancer s/p prostatectomy (2020), complaining of lethargy that began 6/23. He was found to be tachycardic and febrile in the ED, although workup showed unremarkable labs, negative UA, negative RVP, and normal CXR, and admitted for Sepsis likely 2/2 to [facility] .

## 2023-07-01 NOTE — PROGRESS NOTE ADULT - PROBLEM SELECTOR PLAN 2
on hycodan (5 mL PO Q4)   on tessalon (200 mg PO Q8) and guanifesin/dextromethorphan (10 mL PO Q6) -hx of prostate cancer  -s/p radiation therapy and prostatectomy in 2020  -in remission as per patient

## 2023-07-01 NOTE — PROGRESS NOTE ADULT - PROBLEM SELECTOR PLAN 3
-hx of prostate cancer  -s/p radiation therapy and prostatectomy in 2020  -in remission as per patient -monitor glucose levels  - started on sliding scale

## 2023-07-01 NOTE — PROGRESS NOTE ADULT - PROBLEM SELECTOR PLAN 5
-Hb 9.6 (6/30); near baseline (10-11)  -started on oral iron Diet: diabetic diet  Dispo: home  DVT ppx: on lovenox

## 2023-07-01 NOTE — PROGRESS NOTE ADULT - PROBLEM SELECTOR PLAN 4
-monitor glucose levels  - started on sliding scale -Hb 9.6 (6/30); near baseline (10-11)  -started on oral iron

## 2023-07-01 NOTE — PROGRESS NOTE ADULT - ATTENDING COMMENTS
62M with DM2 on Metformin and Ozempic, prostate CA s/p RT and prostatectomy in 2020 presents with lethargy, dry cough, fevers/chills since Friday.  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  Currently appears well, tolerating diet and nontoxic appearing.     No fevers for 2 days now. Feeling better, cough also somewhat improved but not significantly.     #Sepsis due to RLL PNA  - had been persistently febrile on zosyn  - as seen on CT chest  - ID consulted, appreciate input  - MRSA PCR neg, off vancomycin  - switched from zosyn to meropenem  - added azithromycin  - if continuing to improve by 7/2, will switch to levofloxacin for 5 additional days of treatment  - F/U BCx and UCx - NGTD  - sputum Cx with multiple organisms, likely contaminated by oral phill  - f/u legionella resp Cx    Anticipate d/c Sunday if still improving, to complete additional levofloxacin 5 days after discharge.

## 2023-07-01 NOTE — PROGRESS NOTE ADULT - PROBLEM SELECTOR PLAN 1
-pt complain of lethargy  - found to be tachycardic and febrile on admission  -CT showed RLL PNA  -following ID rec meropenem 1q Q8 and azithromycin 500mg Q24  -also as per ID, if pt continues to improve, dc meropenem and azithromycin and start levofloxacin 750mg PO daily for 5 day course (7/2-7/9) -pt complain of lethargy  -also has cough, for which he is on hycodan (5 mL PO Q4), tessalon (200 mg PO Q8) and guanifesin/dextromethorphan (10 mL PO Q6)  - found to be tachycardic and febrile on admission  -CT showed RLL PNA  -following ID rec meropenem 1q Q8 and azithromycin 500mg Q24  -also as per ID, if pt continues to improve, dc meropenem and azithromycin and start levofloxacin 750mg PO daily for 5 day course (7/2-7/9)

## 2023-07-01 NOTE — PROGRESS NOTE ADULT - SUBJECTIVE AND OBJECTIVE BOX
PROGRESS NOTE:   Authored by Dr. Juan Diego Briones MD (PGY-1). Pager [facility] [phone]/ [facility]     Patient is a 62y old  Male who presents with a chief complaint of Sepsis (29 Jun 2023 09:10)      SUBJECTIVE / OVERNIGHT EVENTS:  No acute events overnight. He is feeling better this morning and has no complaints.     MEDICATIONS  (STANDING):  albuterol/ipratropium for Nebulization 3 milliLiter(s) Nebulizer every 6 hours  azithromycin  IVPB      azithromycin  IVPB 500 milliGRAM(s) IV Intermittent every 24 hours  dextrose 5%. 1000 milliLiter(s) (100 mL/Hr) IV Continuous <Continuous>  dextrose 5%. 1000 milliLiter(s) (50 mL/Hr) IV Continuous <Continuous>  dextrose 50% Injectable 25 Gram(s) IV Push once  dextrose 50% Injectable 12.5 Gram(s) IV Push once  dextrose 50% Injectable 25 Gram(s) IV Push once  enoxaparin Injectable 40 milliGRAM(s) SubCutaneous every 24 hours  ferrous    sulfate 325 milliGRAM(s) Oral daily  glucagon  Injectable 1 milliGRAM(s) IntraMuscular once  insulin lispro (ADMELOG) corrective regimen sliding scale   SubCutaneous three times a day before meals  insulin lispro (ADMELOG) corrective regimen sliding scale   SubCutaneous at bedtime  meropenem  IVPB 1000 milliGRAM(s) IV Intermittent every 8 hours  polyethylene glycol 3350 17 Gram(s) Oral daily  senna 2 Tablet(s) Oral at bedtime    MEDICATIONS  (PRN):  acetaminophen     Tablet .. 650 milliGRAM(s) Oral every 6 hours PRN Temp greater or equal to 38C (100.4F), Mild Pain (1 - 3)  benzonatate 200 milliGRAM(s) Oral every 8 hours PRN Cough  dextrose Oral Gel 15 Gram(s) Oral once PRN Blood Glucose LESS THAN 70 milliGRAM(s)/deciliter  guaifenesin/dextromethorphan Oral Liquid 10 milliLiter(s) Oral every 6 hours PRN Cough  hydrocodone/homatropine Syrup 5 milliLiter(s) Oral every 4 hours PRN Cough      CAPILLARY BLOOD GLUCOSE      POCT Blood Glucose.: 159 mg/dL (01 Jul 2023 08:38)  POCT Blood Glucose.: 149 mg/dL (30 Jun 2023 22:32)  POCT Blood Glucose.: 171 mg/dL (30 Jun 2023 18:08)  POCT Blood Glucose.: 165 mg/dL (30 Jun 2023 12:25)    I&O's Summary      PHYSICAL EXAM:  Vital Signs Last 24 Hrs  T(C): 36.8 (01 Jul 2023 05:35), Max: 37.1 (30 Jun 2023 21:27)  T(F): 98.3 (01 Jul 2023 05:35), Max: 98.7 (30 Jun 2023 21:27)  HR: 84 (01 Jul 2023 05:35) (68 - 90)  BP: 118/76 (01 Jul 2023 05:35) (118/76 - 136/90)  BP(mean): --  RR: 17 (01 Jul 2023 05:35) (17 - 18)  SpO2: 96% (01 Jul 2023 05:35) (94% - 98%)    Parameters below as of 01 Jul 2023 05:35  Patient On (Oxygen Delivery Method): room air        CONSTITUTIONAL: NAD, well-developed  HEET: MMM, EOMI, PERRLA  NECK: supple  RESPIRATORY: Normal respiratory effort; lungs are clear to auscultation bilaterally  CARDIOVASCULAR: Regular rate and rhythm, normal S1 and S2, no murmur/rub/gallop; No lower extremity edema; Peripheral pulses are 2+ bilaterally  ABDOMEN: Nontender to palpation, normoactive bowel sounds, no rebound/guarding; No hepatosplenomegaly  MUSCULOSKELETAL: no clubbing or cyanosis of digits; no joint swelling or tenderness to palpation  NEURO: Moving all four extremities, sensation grossly intact  PSYCH: A+O to person, place, and time; affect appropriate  SKIN: No rash    LABS:                        9.6    5.46  )-----------( 335      ( 01 Jul 2023 06:46 )             30.3     07-01    136  |  98  |  12  ----------------------------<  155<H>  4.4   |  27  |  0.92    Ca    9.5      01 Jul 2023 06:46  Phos  3.3     07-01  Mg     2.40     07-01            Urinalysis Basic - ( 01 Jul 2023 06:46 )    Color: x / Appearance: x / SG: x / pH: x  Gluc: 155 mg/dL / Ketone: x  / Bili: x / Urobili: x   Blood: x / Protein: x / Nitrite: x   Leuk Esterase: x / RBC: x / WBC x   Sq Epi: x / Non Sq Epi: x / Bacteria: x        Culture - Legionella (collected 28 Jun 2023 13:37)  Source: .Legionella Legionella  Preliminary Report (30 Jun 2023 20:47):    Culture in progress        Tele Reviewed:    RADIOLOGY & ADDITIONAL TESTS:  Results Reviewed:   Imaging Personally Reviewed:  Electrocardiogram Personally Reviewed:

## 2023-07-02 ENCOUNTER — TRANSCRIPTION ENCOUNTER (OUTPATIENT)
Age: 63
End: 2023-07-02

## 2023-07-02 VITALS — OXYGEN SATURATION: 98 %

## 2023-07-02 LAB
ANION GAP SERPL CALC-SCNC: 12 MMOL/L — SIGNIFICANT CHANGE UP (ref 7–14)
BUN SERPL-MCNC: 9 MG/DL — SIGNIFICANT CHANGE UP (ref 7–23)
CALCIUM SERPL-MCNC: 9.1 MG/DL — SIGNIFICANT CHANGE UP (ref 8.4–10.5)
CHLORIDE SERPL-SCNC: 101 MMOL/L — SIGNIFICANT CHANGE UP (ref 98–107)
CO2 SERPL-SCNC: 26 MMOL/L — SIGNIFICANT CHANGE UP (ref 22–31)
CREAT SERPL-MCNC: 0.89 MG/DL — SIGNIFICANT CHANGE UP (ref 0.5–1.3)
EGFR: 97 ML/MIN/1.73M2 — SIGNIFICANT CHANGE UP
GLUCOSE BLDC GLUCOMTR-MCNC: 144 MG/DL — HIGH (ref 70–99)
GLUCOSE BLDC GLUCOMTR-MCNC: 149 MG/DL — HIGH (ref 70–99)
GLUCOSE SERPL-MCNC: 145 MG/DL — HIGH (ref 70–99)
HCT VFR BLD CALC: 30.7 % — LOW (ref 39–50)
HGB BLD-MCNC: 9.6 G/DL — LOW (ref 13–17)
MAGNESIUM SERPL-MCNC: 2.3 MG/DL — SIGNIFICANT CHANGE UP (ref 1.6–2.6)
MCHC RBC-ENTMCNC: 28.6 PG — SIGNIFICANT CHANGE UP (ref 27–34)
MCHC RBC-ENTMCNC: 31.3 GM/DL — LOW (ref 32–36)
MCV RBC AUTO: 91.4 FL — SIGNIFICANT CHANGE UP (ref 80–100)
NRBC # BLD: 0 /100 WBCS — SIGNIFICANT CHANGE UP (ref 0–0)
NRBC # FLD: 0.04 K/UL — HIGH (ref 0–0)
PHOSPHATE SERPL-MCNC: 2.9 MG/DL — SIGNIFICANT CHANGE UP (ref 2.5–4.5)
PLATELET # BLD AUTO: 409 K/UL — HIGH (ref 150–400)
POTASSIUM SERPL-MCNC: 4.2 MMOL/L — SIGNIFICANT CHANGE UP (ref 3.5–5.3)
POTASSIUM SERPL-SCNC: 4.2 MMOL/L — SIGNIFICANT CHANGE UP (ref 3.5–5.3)
RBC # BLD: 3.36 M/UL — LOW (ref 4.2–5.8)
RBC # FLD: 14.8 % — HIGH (ref 10.3–14.5)
SODIUM SERPL-SCNC: 139 MMOL/L — SIGNIFICANT CHANGE UP (ref 135–145)
WBC # BLD: 7.98 K/UL — SIGNIFICANT CHANGE UP (ref 3.8–10.5)
WBC # FLD AUTO: 7.98 K/UL — SIGNIFICANT CHANGE UP (ref 3.8–10.5)

## 2023-07-02 PROCEDURE — 99239 HOSP IP/OBS DSCHRG MGMT >30: CPT | Mod: GC

## 2023-07-02 RX ORDER — LEVOFLOXACIN 5 MG/ML
1 INJECTION, SOLUTION INTRAVENOUS
Qty: 5 | Refills: 0
Start: 2023-07-02 | End: 2023-07-06

## 2023-07-02 RX ORDER — FERROUS SULFATE 325(65) MG
1 TABLET ORAL
Qty: 30 | Refills: 0
Start: 2023-07-02 | End: 2023-07-31

## 2023-07-02 RX ADMIN — MEROPENEM 100 MILLIGRAM(S): 1 INJECTION INTRAVENOUS at 05:44

## 2023-07-02 RX ADMIN — Medication 3 MILLILITER(S): at 04:07

## 2023-07-02 RX ADMIN — Medication 3 MILLILITER(S): at 15:53

## 2023-07-02 RX ADMIN — Medication 3 MILLILITER(S): at 09:20

## 2023-07-02 RX ADMIN — MEROPENEM 100 MILLIGRAM(S): 1 INJECTION INTRAVENOUS at 14:14

## 2023-07-02 RX ADMIN — Medication 325 MILLIGRAM(S): at 12:32

## 2023-07-02 NOTE — PROGRESS NOTE ADULT - PROBLEM SELECTOR PROBLEM 1
Sepsis
Systemic inflammatory response syndrome (SIRS)
Sepsis
Sepsis
Systemic inflammatory response syndrome (SIRS)

## 2023-07-02 NOTE — PROGRESS NOTE ADULT - PROVIDER SPECIALTY LIST ADULT
Infectious Disease
Internal Medicine
Infectious Disease
Infectious Disease
Internal Medicine

## 2023-07-02 NOTE — DISCHARGE NOTE NURSING/CASE MANAGEMENT/SOCIAL WORK - PATIENT PORTAL LINK FT
You can access the FollowMyHealth Patient Portal offered by [facility] by registering at the following website: [email or web address]. By joining [employer]’s FollowMyHealth portal, you will also be able to view your health information using other applications (apps) compatible with our system.

## 2023-07-02 NOTE — DISCHARGE NOTE NURSING/CASE MANAGEMENT/SOCIAL WORK - NSDCPEFALRISK_GEN_ALL_CORE
For information on Fall & Injury Prevention, visit: [email or web address] OR  [email or web address] OR  https://www.cdc.gov/steadi/patient.html

## 2023-07-02 NOTE — PROGRESS NOTE ADULT - PROBLEM SELECTOR PLAN 1
-pt complain of lethargy  -also has cough, for which he is on hycodan (5 mL PO Q4), tessalon (200 mg PO Q8) and guanifesin/dextromethorphan (10 mL PO Q6)  - found to be tachycardic and febrile on admission  -CT showed RLL PNA  -following ID rec meropenem 1q Q8 and azithromycin 500mg Q24  -also as per ID, if pt continues to improve, dc meropenem and azithromycin and start levofloxacin 750mg PO daily for 5 day course (7/2-7/9)

## 2023-07-02 NOTE — DISCHARGE NOTE NURSING/CASE MANAGEMENT/SOCIAL WORK - NSPROEXTENSIONSOFSELF_GEN_A_NUR
Chief Complaint   Patient presents with     Infusion     pt here for infusion of IVF for Colon cancer       
none

## 2023-07-02 NOTE — PROGRESS NOTE ADULT - SUBJECTIVE AND OBJECTIVE BOX
PROGRESS NOTE:   Authored by Dr. Margaux Claire MD (PGY-3). Pager [facility] [phone]/ [facility] [id number]    Patient is a 62y old  Male who presents with a chief complaint of Sepsis (01 Jul 2023 10:58)      SUBJECTIVE / OVERNIGHT EVENTS:  No acute events overnight.     MEDICATIONS  (STANDING):  albuterol/ipratropium for Nebulization 3 milliLiter(s) Nebulizer every 6 hours  azithromycin  IVPB 500 milliGRAM(s) IV Intermittent every 24 hours  azithromycin  IVPB      dextrose 5%. 1000 milliLiter(s) (100 mL/Hr) IV Continuous <Continuous>  dextrose 5%. 1000 milliLiter(s) (50 mL/Hr) IV Continuous <Continuous>  dextrose 50% Injectable 25 Gram(s) IV Push once  dextrose 50% Injectable 25 Gram(s) IV Push once  dextrose 50% Injectable 12.5 Gram(s) IV Push once  enoxaparin Injectable 40 milliGRAM(s) SubCutaneous every 24 hours  ferrous    sulfate 325 milliGRAM(s) Oral daily  glucagon  Injectable 1 milliGRAM(s) IntraMuscular once  insulin lispro (ADMELOG) corrective regimen sliding scale   SubCutaneous at bedtime  insulin lispro (ADMELOG) corrective regimen sliding scale   SubCutaneous three times a day before meals  meropenem  IVPB 1000 milliGRAM(s) IV Intermittent every 8 hours  polyethylene glycol 3350 17 Gram(s) Oral daily  senna 2 Tablet(s) Oral at bedtime    MEDICATIONS  (PRN):  acetaminophen     Tablet .. 650 milliGRAM(s) Oral every 6 hours PRN Temp greater or equal to 38C (100.4F), Mild Pain (1 - 3)  benzonatate 200 milliGRAM(s) Oral every 8 hours PRN Cough  dextrose Oral Gel 15 Gram(s) Oral once PRN Blood Glucose LESS THAN 70 milliGRAM(s)/deciliter  guaifenesin/dextromethorphan Oral Liquid 10 milliLiter(s) Oral every 6 hours PRN Cough  hydrocodone/homatropine Syrup 5 milliLiter(s) Oral every 4 hours PRN Cough      CAPILLARY BLOOD GLUCOSE      POCT Blood Glucose.: 148 mg/dL (01 Jul 2023 21:47)  POCT Blood Glucose.: 131 mg/dL (01 Jul 2023 18:03)  POCT Blood Glucose.: 177 mg/dL (01 Jul 2023 12:21)  POCT Blood Glucose.: 159 mg/dL (01 Jul 2023 08:38)    I&O's Summary      PHYSICAL EXAM:  Vital Signs Last 24 Hrs  T(C): 36.5 (01 Jul 2023 21:55), Max: 37 (01 Jul 2023 12:32)  T(F): 97.7 (01 Jul 2023 21:55), Max: 98.6 (01 Jul 2023 12:32)  HR: 80 (02 Jul 2023 04:07) (78 - 88)  BP: 104/70 (01 Jul 2023 21:55) (104/70 - 116/75)  BP(mean): --  RR: 18 (01 Jul 2023 21:55) (17 - 18)  SpO2: 98% (02 Jul 2023 04:07) (96% - 98%)    Parameters below as of 02 Jul 2023 04:07  Patient On (Oxygen Delivery Method): room air        CONSTITUTIONAL: NAD  HEENT: MMM, EOMI, PERRLA  NECK: supple  RESPIRATORY: Normal respiratory effort; lungs are clear to auscultation bilaterally  CARDIOVASCULAR: Regular rate and rhythm, normal S1 and S2, no murmur/rub/gallop; No lower extremity edema  ABDOMEN: Nontender to palpation, normoactive bowel sounds, no rebound/guarding; No hepatosplenomegaly  MUSCULOSKELETAL: no clubbing or cyanosis of digits; no joint swelling or tenderness to palpation  NEURO: alert and oriented to person, place, and time. Moving all four extremities, sensation grossly intact  PSYCH: alert and oriented to person, place, and time; affect appropriate  SKIN: No rash    LABS:                        9.6    5.46  )-----------( 335      ( 01 Jul 2023 06:46 )             30.3     07-01    136  |  98  |  12  ----------------------------<  155<H>  4.4   |  27  |  0.92    Ca    9.5      01 Jul 2023 06:46  Phos  3.3     07-01  Mg     2.40     07-01            Urinalysis Basic - ( 01 Jul 2023 06:46 )    Color: x / Appearance: x / SG: x / pH: x  Gluc: 155 mg/dL / Ketone: x  / Bili: x / Urobili: x   Blood: x / Protein: x / Nitrite: x   Leuk Esterase: x / RBC: x / WBC x   Sq Epi: x / Non Sq Epi: x / Bacteria: x          Tele Reviewed:    RADIOLOGY & ADDITIONAL TESTS:  Results Reviewed:   Imaging Personally Reviewed:  Electrocardiogram Personally Reviewed:

## 2023-07-02 NOTE — PROGRESS NOTE ADULT - ASSESSMENT
62 year old male, with history of diabetes and prostate cancer s/p prostatectomy (2020), complaining of lethargy that began 6/23. He was found to be tachycardic and febrile in the ED, although workup showed unremarkable labs, negative UA, negative RVP, and normal CXR, and admitted for Sepsis  2/2 to [facility] .

## 2023-07-05 ENCOUNTER — NON-APPOINTMENT (OUTPATIENT)
Age: 63
End: 2023-07-05

## 2023-07-07 ENCOUNTER — APPOINTMENT (OUTPATIENT)
Dept: INTERNAL MEDICINE | Facility: CLINIC | Age: 63
End: 2023-07-07
Payer: COMMERCIAL

## 2023-07-07 ENCOUNTER — NON-APPOINTMENT (OUTPATIENT)
Age: 63
End: 2023-07-07

## 2023-07-07 VITALS
DIASTOLIC BLOOD PRESSURE: 80 MMHG | BODY MASS INDEX: 33.5 KG/M2 | HEIGHT: 70 IN | WEIGHT: 234 LBS | OXYGEN SATURATION: 94 % | SYSTOLIC BLOOD PRESSURE: 125 MMHG | TEMPERATURE: 98.5 F | HEART RATE: 96 BPM

## 2023-07-07 DIAGNOSIS — R05.9 COUGH, UNSPECIFIED: ICD-10-CM

## 2023-07-07 LAB
CULTURE RESULTS: SIGNIFICANT CHANGE UP
SPECIMEN SOURCE: SIGNIFICANT CHANGE UP

## 2023-07-07 PROCEDURE — 99496 TRANSJ CARE MGMT HIGH F2F 7D: CPT

## 2023-07-07 RX ORDER — SODIUM SULFATE, POTASSIUM SULFATE AND MAGNESIUM SULFATE 1.6; 3.13; 17.5 G/177ML; G/177ML; G/177ML
17.5-3.13-1.6 SOLUTION ORAL
Qty: 1 | Refills: 0 | Status: DISCONTINUED | COMMUNITY
Start: 2023-04-21 | End: 2023-07-07

## 2023-07-07 RX ORDER — AMOXICILLIN AND CLAVULANATE POTASSIUM 875; 125 MG/1; MG/1
875-125 TABLET, COATED ORAL
Qty: 14 | Refills: 1 | Status: DISCONTINUED | COMMUNITY
Start: 2023-05-24 | End: 2023-07-07

## 2023-07-07 NOTE — HISTORY OF PRESENT ILLNESS
[Post-hospitalization from ___ Hospital] : Post-hospitalization from [unfilled] Hospital [Admitted on: ___] : The patient was admitted on [unfilled] [Discharged on ___] : discharged on [unfilled] [Discharge Summary] : discharge summary [Discharge Med List] : discharge medication list [FreeTextEntry2] : Patient admitted with sepsis  , temp 103 and weakness\par CT scan showed left lower pneumonia\par responded to antibiotic very slowly \par discharged on levaquin 750 daily finished toda\par stilll weak and no appetite\par given iorn and stool dark green

## 2023-07-07 NOTE — PHYSICAL EXAM
[Normal] : soft, non-tender, non-distended, no masses palpated, no HSM and normal bowel sounds [56463 - Moderate Complexity requires multiple possible diagnoses and/or the management options, moderate complexity of the medical data (tests, etc.) to be reviewed, and moderate risk of significant complications, morbidity, and/or mortality as well as co] : Moderate Complexity

## 2023-07-07 NOTE — PLAN
[FreeTextEntry1] : Weakness from pneumonia\par Stop ozempic\par hild iron\par off work until July 31\par need f/u ct scan chest

## 2023-07-08 ENCOUNTER — RX RENEWAL (OUTPATIENT)
Age: 63
End: 2023-07-08

## 2023-07-20 DIAGNOSIS — J18.9 PNEUMONIA, UNSPECIFIED ORGANISM: ICD-10-CM

## 2023-09-13 DIAGNOSIS — R93.89 ABNORMAL FINDINGS ON DIAGNOSTIC IMAGING OF OTHER SPECIFIED BODY STRUCTURES: ICD-10-CM

## 2023-11-13 ENCOUNTER — OUTPATIENT (OUTPATIENT)
Dept: OUTPATIENT SERVICES | Facility: HOSPITAL | Age: 63
LOS: 1 days | End: 2023-11-13
Payer: COMMERCIAL

## 2023-11-13 ENCOUNTER — APPOINTMENT (OUTPATIENT)
Dept: CT IMAGING | Facility: IMAGING CENTER | Age: 63
End: 2023-11-13
Payer: COMMERCIAL

## 2023-11-13 DIAGNOSIS — R93.89 ABNORMAL FINDINGS ON DIAGNOSTIC IMAGING OF OTHER SPECIFIED BODY STRUCTURES: ICD-10-CM

## 2023-11-13 DIAGNOSIS — Z90.79 ACQUIRED ABSENCE OF OTHER GENITAL ORGAN(S): Chronic | ICD-10-CM

## 2023-11-13 PROCEDURE — 71250 CT THORAX DX C-: CPT

## 2023-11-13 PROCEDURE — 71250 CT THORAX DX C-: CPT | Mod: 26

## 2024-01-12 ENCOUNTER — APPOINTMENT (OUTPATIENT)
Dept: INTERNAL MEDICINE | Facility: CLINIC | Age: 64
End: 2024-01-12
Payer: COMMERCIAL

## 2024-01-12 VITALS
WEIGHT: 242 LBS | OXYGEN SATURATION: 95 % | SYSTOLIC BLOOD PRESSURE: 154 MMHG | BODY MASS INDEX: 34.65 KG/M2 | TEMPERATURE: 98.1 F | DIASTOLIC BLOOD PRESSURE: 84 MMHG | HEART RATE: 68 BPM | HEIGHT: 70 IN

## 2024-01-12 DIAGNOSIS — R60.0 LOCALIZED EDEMA: ICD-10-CM

## 2024-01-12 PROCEDURE — 36415 COLL VENOUS BLD VENIPUNCTURE: CPT

## 2024-01-12 PROCEDURE — 99214 OFFICE O/P EST MOD 30 MIN: CPT | Mod: 25

## 2024-01-12 RX ORDER — SEMAGLUTIDE 1.34 MG/ML
2 INJECTION, SOLUTION SUBCUTANEOUS
Qty: 1 | Refills: 3 | Status: DISCONTINUED | COMMUNITY
Start: 2023-02-16 | End: 2024-01-12

## 2024-01-12 RX ORDER — BLOOD SUGAR DIAGNOSTIC
STRIP MISCELLANEOUS
Qty: 50 | Refills: 5 | Status: ACTIVE | COMMUNITY
Start: 2020-07-23 | End: 1900-01-01

## 2024-01-12 RX ORDER — LANCETS 30 GAUGE
EACH MISCELLANEOUS
Qty: 50 | Refills: 0 | Status: ACTIVE | COMMUNITY
Start: 2020-07-23 | End: 1900-01-01

## 2024-01-12 RX ORDER — FLUTICASONE PROPIONATE AND SALMETEROL 250; 50 UG/1; UG/1
250-50 POWDER RESPIRATORY (INHALATION)
Qty: 1 | Refills: 0 | Status: DISCONTINUED | COMMUNITY
Start: 2023-05-24 | End: 2024-01-12

## 2024-01-12 RX ORDER — SEMAGLUTIDE 1.34 MG/ML
4 INJECTION, SOLUTION SUBCUTANEOUS
Qty: 1 | Refills: 3 | Status: ACTIVE | COMMUNITY
Start: 2023-07-08 | End: 1900-01-01

## 2024-01-12 RX ORDER — BLOOD-GLUCOSE METER
W/DEVICE EACH MISCELLANEOUS
Qty: 1 | Refills: 0 | Status: ACTIVE | COMMUNITY
Start: 2020-07-23 | End: 1900-01-01

## 2024-01-12 NOTE — HISTORY OF PRESENT ILLNESS
[FreeTextEntry1] : diabetes and bp f/u [de-identified] : diabetes on ozempic 0.5  feel well but put on weight eating too much bread Otherwise feel well

## 2024-01-12 NOTE — PLAN
[FreeTextEntry1] : bp good check a1c, cbc and lipi on ozempic 0.5 increase to 1 mg weekly check lipids and thyroid

## 2024-01-14 LAB
ALBUMIN SERPL ELPH-MCNC: 4.5 G/DL
ALP BLD-CCNC: 96 U/L
ALT SERPL-CCNC: 24 U/L
ANION GAP SERPL CALC-SCNC: 11 MMOL/L
APPEARANCE: CLEAR
AST SERPL-CCNC: 17 U/L
BACTERIA: NEGATIVE /HPF
BILIRUB SERPL-MCNC: 0.6 MG/DL
BILIRUBIN URINE: NEGATIVE
BLOOD URINE: NEGATIVE
BUN SERPL-MCNC: 12 MG/DL
CALCIUM SERPL-MCNC: 9.6 MG/DL
CAST: 0 /LPF
CHLORIDE SERPL-SCNC: 101 MMOL/L
CHOLEST SERPL-MCNC: 150 MG/DL
CO2 SERPL-SCNC: 25 MMOL/L
COLOR: YELLOW
CREAT SERPL-MCNC: 0.92 MG/DL
EGFR: 93 ML/MIN/1.73M2
EPITHELIAL CELLS: 0 /HPF
ESTIMATED AVERAGE GLUCOSE: 157 MG/DL
GLUCOSE QUALITATIVE U: NEGATIVE MG/DL
GLUCOSE SERPL-MCNC: 123 MG/DL
HBA1C MFR BLD HPLC: 7.1 %
HCT VFR BLD CALC: 35.4 %
HDLC SERPL-MCNC: 46 MG/DL
HGB BLD-MCNC: 11.1 G/DL
KETONES URINE: NEGATIVE MG/DL
LDLC SERPL CALC-MCNC: 85 MG/DL
LEUKOCYTE ESTERASE URINE: NEGATIVE
MCHC RBC-ENTMCNC: 28.9 PG
MCHC RBC-ENTMCNC: 31.4 GM/DL
MCV RBC AUTO: 92.2 FL
MICROSCOPIC-UA: NORMAL
NITRITE URINE: NEGATIVE
NONHDLC SERPL-MCNC: 104 MG/DL
PH URINE: 5.5
PLATELET # BLD AUTO: 234 K/UL
POTASSIUM SERPL-SCNC: 4.8 MMOL/L
PROT SERPL-MCNC: 7 G/DL
PROTEIN URINE: NEGATIVE MG/DL
RBC # BLD: 3.84 M/UL
RBC # FLD: 13.7 %
RED BLOOD CELLS URINE: 0 /HPF
SODIUM SERPL-SCNC: 138 MMOL/L
SPECIFIC GRAVITY URINE: 1.02
T4 FREE SERPL-MCNC: 1.1 NG/DL
TRIGL SERPL-MCNC: 103 MG/DL
TSH SERPL-ACNC: 1.45 UIU/ML
UROBILINOGEN URINE: 0.2 MG/DL
WBC # FLD AUTO: 3.98 K/UL
WHITE BLOOD CELLS URINE: 1 /HPF

## 2024-02-20 ENCOUNTER — RX RENEWAL (OUTPATIENT)
Age: 64
End: 2024-02-20

## 2024-04-12 ENCOUNTER — APPOINTMENT (OUTPATIENT)
Dept: INTERNAL MEDICINE | Facility: CLINIC | Age: 64
End: 2024-04-12
Payer: COMMERCIAL

## 2024-04-12 VITALS
WEIGHT: 245 LBS | TEMPERATURE: 96.8 F | SYSTOLIC BLOOD PRESSURE: 142 MMHG | OXYGEN SATURATION: 96 % | DIASTOLIC BLOOD PRESSURE: 73 MMHG | RESPIRATION RATE: 15 BRPM | HEART RATE: 72 BPM | HEIGHT: 70 IN | BODY MASS INDEX: 35.07 KG/M2

## 2024-04-12 DIAGNOSIS — E78.00 PURE HYPERCHOLESTEROLEMIA, UNSPECIFIED: ICD-10-CM

## 2024-04-12 DIAGNOSIS — E11.9 TYPE 2 DIABETES MELLITUS W/OUT COMPLICATIONS: ICD-10-CM

## 2024-04-12 DIAGNOSIS — I89.0 LYMPHEDEMA, NOT ELSEWHERE CLASSIFIED: ICD-10-CM

## 2024-04-12 DIAGNOSIS — M10.9 GOUT, UNSPECIFIED: ICD-10-CM

## 2024-04-12 DIAGNOSIS — C61 MALIGNANT NEOPLASM OF PROSTATE: ICD-10-CM

## 2024-04-12 DIAGNOSIS — R97.20 ELEVATED PROSTATE, SPECIFIC ANTIGEN [PSA]: ICD-10-CM

## 2024-04-12 DIAGNOSIS — Z00.00 ENCOUNTER FOR GENERAL ADULT MEDICAL EXAMINATION W/OUT ABNORMAL FINDINGS: ICD-10-CM

## 2024-04-12 PROCEDURE — 99396 PREV VISIT EST AGE 40-64: CPT

## 2024-04-12 PROCEDURE — 36415 COLL VENOUS BLD VENIPUNCTURE: CPT

## 2024-04-12 RX ORDER — MUPIROCIN 20 MG/G
2 OINTMENT TOPICAL 3 TIMES DAILY
Qty: 3 | Refills: 3 | Status: ACTIVE | COMMUNITY
Start: 2017-09-20 | End: 1900-01-01

## 2024-04-12 RX ORDER — BLOOD-GLUCOSE SENSOR
EACH MISCELLANEOUS
Qty: 2 | Refills: 5 | Status: ACTIVE | COMMUNITY
Start: 2023-05-17 | End: 1900-01-01

## 2024-04-12 RX ORDER — METFORMIN HYDROCHLORIDE 625 MG/1
625 TABLET ORAL
Qty: 360 | Refills: 0 | Status: ACTIVE | COMMUNITY
Start: 2024-02-20 | End: 1900-01-01

## 2024-04-12 NOTE — HEALTH RISK ASSESSMENT
[Very Good] : ~his/her~  mood as very good [No] : No [Never (0 pts)] : Never (0 points) [No falls in past year] : Patient reported no falls in the past year [0] : 2) Feeling down, depressed, or hopeless: Not at all (0) [PHQ-2 Negative - No further assessment needed] : PHQ-2 Negative - No further assessment needed [Never] : Never [JMO6Demcc] : 0 [Change in mental status noted] : No change in mental status noted [Language] : denies difficulty with language [Behavior] : denies difficulty with behavior [Learning/Retaining New Information] : denies difficulty learning/retaining new information [Handling Complex Tasks] : denies difficulty handling complex tasks [Reasoning] : denies difficulty with reasoning [None] : None [Alone] : lives alone [Employed] : employed [Feels Safe at Home] : Feels safe at home [Fully functional (bathing, dressing, toileting, transferring, walking, feeding)] : Fully functional (bathing, dressing, toileting, transferring, walking, feeding) [Fully functional (using the telephone, shopping, preparing meals, housekeeping, doing laundry, using] : Fully functional and needs no help or supervision to perform IADLs (using the telephone, shopping, preparing meals, housekeeping, doing laundry, using transportation, managing medications and managing finances) [Reports changes in hearing] : Reports no changes in hearing [Reports changes in vision] : Reports no changes in vision [Reports changes in dental health] : Reports no changes in dental health [Smoke Detector] : smoke detector [Carbon Monoxide Detector] : carbon monoxide detector [Guns at Home] : no guns at home [Seat Belt] :  uses seat belt

## 2024-04-12 NOTE — PLAN
[FreeTextEntry1] : Annual decline flu  Under care for prostate ca bp borderline low salt observe diabetes discussed on metformin and ozempic 1

## 2024-04-12 NOTE — HISTORY OF PRESENT ILLNESS
[FreeTextEntry1] : Annual [de-identified] : Annual decline flu recent colon prostate ca s/p surgery and radiation on eligard with hot flashes on metfromin, ozempic with michelle [facility] cardiology swollen left leg (lymphedema) post proate ca diabetes no recent a1c

## 2024-04-14 LAB
ALBUMIN SERPL ELPH-MCNC: 4.4 G/DL
ALP BLD-CCNC: 92 U/L
ALT SERPL-CCNC: 13 U/L
ANION GAP SERPL CALC-SCNC: 10 MMOL/L
APPEARANCE: CLEAR
AST SERPL-CCNC: 16 U/L
BILIRUB SERPL-MCNC: 0.4 MG/DL
BILIRUBIN URINE: NEGATIVE
BLOOD URINE: NEGATIVE
BUN SERPL-MCNC: 16 MG/DL
CALCIUM SERPL-MCNC: 9.7 MG/DL
CHLORIDE SERPL-SCNC: 103 MMOL/L
CHOLEST SERPL-MCNC: 150 MG/DL
CO2 SERPL-SCNC: 25 MMOL/L
COLOR: YELLOW
CREAT SERPL-MCNC: 1.15 MG/DL
EGFR: 72 ML/MIN/1.73M2
ESTIMATED AVERAGE GLUCOSE: 148 MG/DL
GLUCOSE QUALITATIVE U: NEGATIVE MG/DL
GLUCOSE SERPL-MCNC: 79 MG/DL
HBA1C MFR BLD HPLC: 6.8 %
HCT VFR BLD CALC: 35.7 %
HDLC SERPL-MCNC: 48 MG/DL
HGB BLD-MCNC: 11.1 G/DL
KETONES URINE: NEGATIVE MG/DL
LDLC SERPL CALC-MCNC: 89 MG/DL
LEUKOCYTE ESTERASE URINE: NEGATIVE
MCHC RBC-ENTMCNC: 27.8 PG
MCHC RBC-ENTMCNC: 31.1 GM/DL
MCV RBC AUTO: 89.5 FL
NITRITE URINE: NEGATIVE
NONHDLC SERPL-MCNC: 102 MG/DL
PH URINE: 5.5
PLATELET # BLD AUTO: 245 K/UL
POTASSIUM SERPL-SCNC: 5.2 MMOL/L
PROT SERPL-MCNC: 6.9 G/DL
PROTEIN URINE: NEGATIVE MG/DL
PSA SERPL-MCNC: 0.6 NG/ML
RBC # BLD: 3.99 M/UL
RBC # FLD: 14.4 %
SODIUM SERPL-SCNC: 138 MMOL/L
SPECIFIC GRAVITY URINE: 1.02
T4 FREE SERPL-MCNC: 1 NG/DL
TRIGL SERPL-MCNC: 61 MG/DL
TSH SERPL-ACNC: 1.91 UIU/ML
URATE SERPL-MCNC: 4.4 MG/DL
UROBILINOGEN URINE: 0.2 MG/DL
WBC # FLD AUTO: 4.68 K/UL

## 2024-05-28 ENCOUNTER — APPOINTMENT (OUTPATIENT)
Dept: INTERNAL MEDICINE | Facility: CLINIC | Age: 64
End: 2024-05-28
Payer: COMMERCIAL

## 2024-05-28 VITALS — HEIGHT: 70 IN | BODY MASS INDEX: 35.07 KG/M2 | WEIGHT: 245 LBS

## 2024-05-28 DIAGNOSIS — I10 ESSENTIAL (PRIMARY) HYPERTENSION: ICD-10-CM

## 2024-05-28 PROCEDURE — 99442: CPT

## 2024-05-28 NOTE — HISTORY OF PRESENT ILLNESS
[Home] : at home, [unfilled] , at the time of the visit. [Medical Office: ([facility])___] : at the medical office located in  [Verbal consent obtained from patient] : the patient, [unfilled] [FreeTextEntry1] : pre colon [de-identified] : 1 year ago colon poor prep with suprep Hisotry of prostate cancer Diabetes on ozempic/metformin

## 2024-05-28 NOTE — PLAN
[FreeTextEntry1] : colon poor prep suprep Hold ozempic 9 days special miralax prep Soft diet Monday and Tuesday Tuesday 10 pm 2 duclolux tablets Wednestday 10 am 2 duclolux tablets Start Miralax 15 doses every 20 minute at 2 pm Fleet enema 10 pm, wednesday night and Thu5rday morning if needed

## 2024-06-06 ENCOUNTER — APPOINTMENT (OUTPATIENT)
Dept: INTERNAL MEDICINE | Facility: CLINIC | Age: 64
End: 2024-06-06
Payer: COMMERCIAL

## 2024-06-06 ENCOUNTER — LABORATORY RESULT (OUTPATIENT)
Age: 64
End: 2024-06-06

## 2024-06-06 DIAGNOSIS — Z12.11 ENCOUNTER FOR SCREENING FOR MALIGNANT NEOPLASM OF COLON: ICD-10-CM

## 2024-06-06 DIAGNOSIS — D12.6 BENIGN NEOPLASM OF COLON, UNSPECIFIED: ICD-10-CM

## 2024-06-06 PROCEDURE — 45380 COLONOSCOPY AND BIOPSY: CPT

## 2024-07-16 ENCOUNTER — RX RENEWAL (OUTPATIENT)
Age: 64
End: 2024-07-16

## 2024-07-19 ENCOUNTER — EMERGENCY (EMERGENCY)
Facility: HOSPITAL | Age: 64
LOS: 1 days | Discharge: HOME | End: 2024-07-19
Attending: STUDENT IN AN ORGANIZED HEALTH CARE EDUCATION/TRAINING PROGRAM | Admitting: STUDENT IN AN ORGANIZED HEALTH CARE EDUCATION/TRAINING PROGRAM
Payer: COMMERCIAL

## 2024-07-19 VITALS
DIASTOLIC BLOOD PRESSURE: 76 MMHG | HEART RATE: 84 BPM | RESPIRATION RATE: 18 BRPM | TEMPERATURE: 98 F | SYSTOLIC BLOOD PRESSURE: 128 MMHG | OXYGEN SATURATION: 96 % | WEIGHT: 244.93 LBS | HEIGHT: 70 IN

## 2024-07-19 VITALS
SYSTOLIC BLOOD PRESSURE: 112 MMHG | HEART RATE: 71 BPM | OXYGEN SATURATION: 98 % | RESPIRATION RATE: 20 BRPM | DIASTOLIC BLOOD PRESSURE: 72 MMHG | TEMPERATURE: 98 F

## 2024-07-19 DIAGNOSIS — Z90.79 ACQUIRED ABSENCE OF OTHER GENITAL ORGAN(S): Chronic | ICD-10-CM

## 2024-07-19 LAB
ALBUMIN SERPL ELPH-MCNC: 4.2 G/DL — SIGNIFICANT CHANGE UP (ref 3.3–5)
ALP SERPL-CCNC: 94 U/L — SIGNIFICANT CHANGE UP (ref 40–120)
ALT FLD-CCNC: 19 U/L — SIGNIFICANT CHANGE UP (ref 4–41)
ANION GAP SERPL CALC-SCNC: 12 MMOL/L — SIGNIFICANT CHANGE UP (ref 7–14)
APTT BLD: 28.2 SEC — SIGNIFICANT CHANGE UP (ref 24.5–35.6)
AST SERPL-CCNC: 21 U/L — SIGNIFICANT CHANGE UP (ref 4–40)
BASOPHILS # BLD AUTO: 0.04 K/UL — SIGNIFICANT CHANGE UP (ref 0–0.2)
BASOPHILS NFR BLD AUTO: 0.9 % — SIGNIFICANT CHANGE UP (ref 0–2)
BILIRUB SERPL-MCNC: 0.5 MG/DL — SIGNIFICANT CHANGE UP (ref 0.2–1.2)
BLD GP AB SCN SERPL QL: NEGATIVE — SIGNIFICANT CHANGE UP
BUN SERPL-MCNC: 15 MG/DL — SIGNIFICANT CHANGE UP (ref 7–23)
CALCIUM SERPL-MCNC: 9.5 MG/DL — SIGNIFICANT CHANGE UP (ref 8.4–10.5)
CHLORIDE SERPL-SCNC: 104 MMOL/L — SIGNIFICANT CHANGE UP (ref 98–107)
CO2 SERPL-SCNC: 25 MMOL/L — SIGNIFICANT CHANGE UP (ref 22–31)
CREAT SERPL-MCNC: 0.99 MG/DL — SIGNIFICANT CHANGE UP (ref 0.5–1.3)
EGFR: 86 ML/MIN/1.73M2 — SIGNIFICANT CHANGE UP
EOSINOPHIL # BLD AUTO: 0.14 K/UL — SIGNIFICANT CHANGE UP (ref 0–0.5)
EOSINOPHIL NFR BLD AUTO: 3.1 % — SIGNIFICANT CHANGE UP (ref 0–6)
GLUCOSE SERPL-MCNC: 85 MG/DL — SIGNIFICANT CHANGE UP (ref 70–99)
HCT VFR BLD CALC: 37.1 % — LOW (ref 39–50)
HGB BLD-MCNC: 11.5 G/DL — LOW (ref 13–17)
IANC: 2.15 K/UL — SIGNIFICANT CHANGE UP (ref 1.8–7.4)
IMM GRANULOCYTES NFR BLD AUTO: 0.2 % — SIGNIFICANT CHANGE UP (ref 0–0.9)
INR BLD: 0.95 RATIO — SIGNIFICANT CHANGE UP (ref 0.85–1.18)
LYMPHOCYTES # BLD AUTO: 1.74 K/UL — SIGNIFICANT CHANGE UP (ref 1–3.3)
LYMPHOCYTES # BLD AUTO: 38.2 % — SIGNIFICANT CHANGE UP (ref 13–44)
MCHC RBC-ENTMCNC: 27.6 PG — SIGNIFICANT CHANGE UP (ref 27–34)
MCHC RBC-ENTMCNC: 31 GM/DL — LOW (ref 32–36)
MCV RBC AUTO: 89.2 FL — SIGNIFICANT CHANGE UP (ref 80–100)
MONOCYTES # BLD AUTO: 0.47 K/UL — SIGNIFICANT CHANGE UP (ref 0–0.9)
MONOCYTES NFR BLD AUTO: 10.3 % — SIGNIFICANT CHANGE UP (ref 2–14)
NEUTROPHILS # BLD AUTO: 2.15 K/UL — SIGNIFICANT CHANGE UP (ref 1.8–7.4)
NEUTROPHILS NFR BLD AUTO: 47.3 % — SIGNIFICANT CHANGE UP (ref 43–77)
NRBC # BLD: 0 /100 WBCS — SIGNIFICANT CHANGE UP (ref 0–0)
NRBC # FLD: 0 K/UL — SIGNIFICANT CHANGE UP (ref 0–0)
PLATELET # BLD AUTO: 241 K/UL — SIGNIFICANT CHANGE UP (ref 150–400)
POTASSIUM SERPL-MCNC: 4.5 MMOL/L — SIGNIFICANT CHANGE UP (ref 3.5–5.3)
POTASSIUM SERPL-SCNC: 4.5 MMOL/L — SIGNIFICANT CHANGE UP (ref 3.5–5.3)
PROT SERPL-MCNC: 6.6 G/DL — SIGNIFICANT CHANGE UP (ref 6–8.3)
PROTHROM AB SERPL-ACNC: 10.8 SEC — SIGNIFICANT CHANGE UP (ref 9.5–13)
RBC # BLD: 4.16 M/UL — LOW (ref 4.2–5.8)
RBC # FLD: 14.4 % — SIGNIFICANT CHANGE UP (ref 10.3–14.5)
RH IG SCN BLD-IMP: POSITIVE — SIGNIFICANT CHANGE UP
SODIUM SERPL-SCNC: 141 MMOL/L — SIGNIFICANT CHANGE UP (ref 135–145)
WBC # BLD: 4.55 K/UL — SIGNIFICANT CHANGE UP (ref 3.8–10.5)
WBC # FLD AUTO: 4.55 K/UL — SIGNIFICANT CHANGE UP (ref 3.8–10.5)

## 2024-07-19 PROCEDURE — 93010 ELECTROCARDIOGRAM REPORT: CPT

## 2024-07-19 PROCEDURE — 93970 EXTREMITY STUDY: CPT | Mod: 26

## 2024-07-19 PROCEDURE — 99284 EMERGENCY DEPT VISIT MOD MDM: CPT

## 2024-07-19 NOTE — ED PROVIDER NOTE - NSFOLLOWUPINSTRUCTIONS_ED_ALL_ED_FT
You were seen in the [facility] Emergency Department today for left leg swelling. The ultrasound performed today showed NO blood clots in your legs.    All of the lab and imaging results available upon your time of discharge are included in this discharge paperwork. Please take all of your prescribed or over-the-counter medications as prescribed or as directed.    Please follow up with your primary care physician within one week or as needed for continued management and any further evaluation. Or, if you do not have a primary care physician, you may call patient access services at [phone]) find a primary care physician, or call [phone] to make an appointment with the clinic.    Please return to the emergency department for any worsening symptoms or concerns.

## 2024-07-19 NOTE — ED PROVIDER NOTE - PATIENT PORTAL LINK FT
You can access the FollowMyHealth Patient Portal offered by [facility] by registering at the following website: [email or web address]. By joining [employer]’s FollowMyHealth portal, you will also be able to view your health information using other applications (apps) compatible with our system.

## 2024-07-19 NOTE — ED PROVIDER NOTE - CLINICAL SUMMARY MEDICAL DECISION MAKING FREE TEXT BOX
63-year-old male with past medical history of hypertension, diabetes, left hyperlipidemia, history of recurrent dependent leg swelling but no prior history of DVTs or surgical intervention to the lower extremities, presents for persistently worsening swelling and discomfort to the left leg for the past 1 and half weeks.  Patient frequently walks for work and denies any long period of immobility, denies any recent long distance travel, denies any recent surgeries or trauma.  Non-smoker.  Denies any history of cancer.  Denies any numbness or tingling to the lower extremity. He denies any chest pain or shortness of breath or palpitations.    Physical Exam  GEN: Alert and oriented x 3, in no acute distress, speaking full clear sentences  HEENT: NC/AT, PERRL, EOMI, normal oropharynx  NECK: Supple, nontender, FROM  CV: RRR, no m/r/g  PULM: CTA bilat, no wheezing/rales/rhonchi  ABD: Soft, nontender, nondistended. No organomegaly  EXTR: Right lower extremity exam normal, no swelling, no erythema, nontender.  Left lower extremity exam significant for severe swelling with mild pitting edema from the left foot extending all the way up to the left thigh, significant asymmetry between the 2 legs.  2+ DPP and PTP, 5 out of 5 strength, sensation intact bilaterally.  SKIN: Warm, dry, no rash  NEURO: AOx3, speaking full clear sentences, VALENTIN 5/5 strength, ambulating with stable gait    His physical exam is highly concerning for DVT versus some other sort of venous blockage.  Exam is less consistent with cellulitis.  Will pursue duplex DVT study.  If he has a large DVT, he will require admission to initiate anticoagulation treatment.

## 2024-07-19 NOTE — ED ADULT NURSE NOTE - OBJECTIVE STATEMENT
patient Alert & ox4,patient states" My left leg is swollen since about a  week and half". pt . evaluated by MD.  Placed 20g angiocath right  AC., labs drawn and sent. patient will be waiting for results, further evaluation and disposition.  made comfortable.will continue to monitor.

## 2024-07-19 NOTE — ED ADULT TRIAGE NOTE - CHIEF COMPLAINT QUOTE
patient states" My left leg is swollen since about a  week and half". denies CP/SOB. h/o DM takes metformin,   Ozempic ,, h/o  prostate CA, sleep apnea

## 2024-07-26 ENCOUNTER — APPOINTMENT (OUTPATIENT)
Dept: INTERNAL MEDICINE | Facility: CLINIC | Age: 64
End: 2024-07-26
Payer: COMMERCIAL

## 2024-07-26 VITALS
HEIGHT: 70 IN | WEIGHT: 245 LBS | OXYGEN SATURATION: 95 % | TEMPERATURE: 98 F | BODY MASS INDEX: 35.07 KG/M2 | DIASTOLIC BLOOD PRESSURE: 65 MMHG | HEART RATE: 83 BPM | SYSTOLIC BLOOD PRESSURE: 121 MMHG

## 2024-07-26 DIAGNOSIS — M79.89 OTHER SPECIFIED SOFT TISSUE DISORDERS: ICD-10-CM

## 2024-07-26 DIAGNOSIS — R60.0 LOCALIZED EDEMA: ICD-10-CM

## 2024-07-26 DIAGNOSIS — I89.0 LYMPHEDEMA, NOT ELSEWHERE CLASSIFIED: ICD-10-CM

## 2024-07-26 PROCEDURE — 99214 OFFICE O/P EST MOD 30 MIN: CPT

## 2024-07-26 NOTE — HISTORY OF PRESENT ILLNESS
[FreeTextEntry8] : Pt has LLE edema for a year and it got worse over the last 2 weeks. Pt denied any changes in his life and routine.  He went to ED at [facility] last Friday and had test done, it's negative for DVT.  He was sent home to f/u'ed with PCP.  He's been wearing compression stocking but less recently because there was too much edema and it's very uncomfortable.

## 2024-07-26 NOTE — ASSESSMENT
[FreeTextEntry1] : Pt presented for LLE swelling, he stated he has chronic leg swelling after he had surgery for prostate cancer.  It's been there for over a year, he had many inguinal LM resected when he had prostate surgery.  He was treated with compression stockings, which he wears all the time.    However, the LLE swelling was a lot worse over the last couple of weeks.  He went to ED and labs were unremarkable.  He had LE duplex and there was no DVT.  He was discharged home to f/u with PCP.  It appears that pt has lymphedema due to inguinal LN resection, but it's now worse for the last 2-3 weeks.  ED eval was negative for DVT.  I suggested that he goes for lymphedema treatment.  However, etiology of why this worsens is unclear.  It could be due to the recent heat waves, pt also has elevated BMI at 35.  He stated he's very active and is on his feet the whole day at work.  Given previous h/o prostate CA with inguinal LN involvement, I advised that he also f/u with  and oncology to discuss repeat imaging studies to see if he has recurrent lymphadenopatht.

## 2024-07-26 NOTE — HISTORY OF PRESENT ILLNESS
[FreeTextEntry8] : Pt has LLE edema for a year and it got worse over the last 2 weeks. Pt denied any changes in his life and routine.  He went to ED at [facility] last Friday and had test done, it's negative for DVT.  He was sent home to f/u'ed with PCP.  He's been wearing compression stocking but less recently because there was too much edema and it's very uncomfortable.

## 2024-07-26 NOTE — PHYSICAL EXAM
[No Acute Distress] : no acute distress [Well Nourished] : well nourished [Well Developed] : well developed [Supple] : supple [No Respiratory Distress] : no respiratory distress  [Clear to Auscultation] : lungs were clear to auscultation bilaterally [Normal Rate] : normal rate  [Regular Rhythm] : with a regular rhythm [Normal S1, S2] : normal S1 and S2 [Soft] : abdomen soft [Non Tender] : non-tender [Normal Bowel Sounds] : normal bowel sounds [No CVA Tenderness] : no CVA  tenderness [No Spinal Tenderness] : no spinal tenderness [No Joint Swelling] : no joint swelling [Normal Gait] : normal gait [Speech Grossly Normal] : speech grossly normal [Alert and Oriented x3] : oriented to person, place, and time [de-identified] : Obese male in stated age,  [de-identified] : LLE is very edematous, almost twice the size of the RLE, but no erythema, no increased warmth and no tenderness,

## 2024-07-26 NOTE — PHYSICAL EXAM
[No Acute Distress] : no acute distress [Well Nourished] : well nourished [Well Developed] : well developed [Supple] : supple [No Respiratory Distress] : no respiratory distress  [Clear to Auscultation] : lungs were clear to auscultation bilaterally [Normal Rate] : normal rate  [Regular Rhythm] : with a regular rhythm [Normal S1, S2] : normal S1 and S2 [Soft] : abdomen soft [Non Tender] : non-tender [Normal Bowel Sounds] : normal bowel sounds [No CVA Tenderness] : no CVA  tenderness [No Spinal Tenderness] : no spinal tenderness [No Joint Swelling] : no joint swelling [Normal Gait] : normal gait [Speech Grossly Normal] : speech grossly normal [Alert and Oriented x3] : oriented to person, place, and time [de-identified] : Obese male in stated age,  [de-identified] : LLE is very edematous, almost twice the size of the RLE, but no erythema, no increased warmth and no tenderness,

## 2024-08-22 ENCOUNTER — APPOINTMENT (OUTPATIENT)
Dept: INTERNAL MEDICINE | Facility: CLINIC | Age: 64
End: 2024-08-22
Payer: COMMERCIAL

## 2024-08-22 VITALS
DIASTOLIC BLOOD PRESSURE: 74 MMHG | SYSTOLIC BLOOD PRESSURE: 123 MMHG | BODY MASS INDEX: 36.36 KG/M2 | OXYGEN SATURATION: 96 % | WEIGHT: 254 LBS | HEIGHT: 70 IN | RESPIRATION RATE: 15 BRPM | HEART RATE: 82 BPM | TEMPERATURE: 97.7 F

## 2024-08-22 DIAGNOSIS — E11.9 TYPE 2 DIABETES MELLITUS W/OUT COMPLICATIONS: ICD-10-CM

## 2024-08-22 DIAGNOSIS — I89.0 LYMPHEDEMA, NOT ELSEWHERE CLASSIFIED: ICD-10-CM

## 2024-08-22 DIAGNOSIS — I10 ESSENTIAL (PRIMARY) HYPERTENSION: ICD-10-CM

## 2024-08-22 DIAGNOSIS — C61 MALIGNANT NEOPLASM OF PROSTATE: ICD-10-CM

## 2024-08-22 PROCEDURE — 99213 OFFICE O/P EST LOW 20 MIN: CPT

## 2024-08-22 NOTE — HISTORY OF PRESENT ILLNESS
[FreeTextEntry1] : f/u [de-identified] : Severe swellingof leg from lympedema to start lympedem therapy aug 30 failed normal stocking and water pills happened becuase of prostate and ln surger left groin diabetes on metformin 2500 and ozempic 1 recent labs good calc a1c 6.2 michelle

## 2024-08-22 NOTE — PHYSICAL EXAM
[Normal] : no respiratory distress, lungs were clear to auscultation bilaterally and no accessory muscle use [de-identified] : severe left lympedema entire leg to groin

## 2024-10-18 ENCOUNTER — APPOINTMENT (OUTPATIENT)
Dept: INTERNAL MEDICINE | Facility: CLINIC | Age: 64
End: 2024-10-18
Payer: COMMERCIAL

## 2024-10-18 VITALS
SYSTOLIC BLOOD PRESSURE: 118 MMHG | DIASTOLIC BLOOD PRESSURE: 78 MMHG | BODY MASS INDEX: 37.22 KG/M2 | WEIGHT: 260 LBS | HEIGHT: 70 IN

## 2024-10-18 DIAGNOSIS — I89.0 LYMPHEDEMA, NOT ELSEWHERE CLASSIFIED: ICD-10-CM

## 2024-10-18 DIAGNOSIS — E11.9 TYPE 2 DIABETES MELLITUS W/OUT COMPLICATIONS: ICD-10-CM

## 2024-10-18 DIAGNOSIS — C61 MALIGNANT NEOPLASM OF PROSTATE: ICD-10-CM

## 2024-10-18 DIAGNOSIS — I10 ESSENTIAL (PRIMARY) HYPERTENSION: ICD-10-CM

## 2024-10-18 PROCEDURE — 99213 OFFICE O/P EST LOW 20 MIN: CPT | Mod: 25

## 2024-10-18 PROCEDURE — 36415 COLL VENOUS BLD VENIPUNCTURE: CPT

## 2024-10-18 RX ORDER — METFORMIN ER 750 MG 750 MG/1
750 TABLET ORAL 3 TIMES DAILY
Qty: 270 | Refills: 0 | Status: ACTIVE | COMMUNITY
Start: 2024-10-18 | End: 1900-01-01

## 2024-10-19 LAB
ALBUMIN SERPL ELPH-MCNC: 4.2 G/DL
ALP BLD-CCNC: 118 U/L
ALT SERPL-CCNC: 25 U/L
ANION GAP SERPL CALC-SCNC: 12 MMOL/L
APPEARANCE: CLEAR
AST SERPL-CCNC: 24 U/L
BILIRUB SERPL-MCNC: 0.5 MG/DL
BILIRUBIN URINE: NEGATIVE
BLOOD URINE: NEGATIVE
BUN SERPL-MCNC: 14 MG/DL
CALCIUM SERPL-MCNC: 9.4 MG/DL
CHLORIDE SERPL-SCNC: 102 MMOL/L
CHOLEST SERPL-MCNC: 180 MG/DL
CO2 SERPL-SCNC: 24 MMOL/L
COLOR: YELLOW
CREAT SERPL-MCNC: 0.94 MG/DL
EGFR: 91 ML/MIN/1.73M2
ESTIMATED AVERAGE GLUCOSE: 174 MG/DL
GLUCOSE QUALITATIVE U: >=1000 MG/DL
GLUCOSE SERPL-MCNC: 256 MG/DL
HBA1C MFR BLD HPLC: 7.7 %
HCT VFR BLD CALC: 36.3 %
HDLC SERPL-MCNC: 51 MG/DL
HGB BLD-MCNC: 11.1 G/DL
KETONES URINE: NEGATIVE MG/DL
LDLC SERPL CALC-MCNC: 111 MG/DL
LEUKOCYTE ESTERASE URINE: NEGATIVE
MCHC RBC-ENTMCNC: 28 PG
MCHC RBC-ENTMCNC: 30.6 GM/DL
MCV RBC AUTO: 91.4 FL
NITRITE URINE: NEGATIVE
NONHDLC SERPL-MCNC: 129 MG/DL
PH URINE: 5.5
PLATELET # BLD AUTO: 238 K/UL
POTASSIUM SERPL-SCNC: 4.3 MMOL/L
PROT SERPL-MCNC: 6.6 G/DL
PROTEIN URINE: NORMAL MG/DL
RBC # BLD: 3.97 M/UL
RBC # FLD: 14.3 %
SODIUM SERPL-SCNC: 139 MMOL/L
SPECIFIC GRAVITY URINE: 1.03
T4 FREE SERPL-MCNC: 1 NG/DL
TRIGL SERPL-MCNC: 101 MG/DL
TSH SERPL-ACNC: 1.56 UIU/ML
UROBILINOGEN URINE: 0.2 MG/DL
WBC # FLD AUTO: 5.36 K/UL

## 2024-10-22 RX ORDER — METFORMIN ER 500 MG 500 MG/1
500 TABLET ORAL
Qty: 360 | Refills: 3 | Status: ACTIVE | COMMUNITY
Start: 2024-10-22 | End: 1900-01-01

## 2024-10-24 RX ORDER — SEMAGLUTIDE 2.68 MG/ML
8 INJECTION, SOLUTION SUBCUTANEOUS
Qty: 3 | Refills: 3 | Status: ACTIVE | COMMUNITY
Start: 2024-10-24 | End: 1900-01-01

## 2024-11-04 ENCOUNTER — NON-APPOINTMENT (OUTPATIENT)
Age: 64
End: 2024-11-04

## 2024-11-22 ENCOUNTER — APPOINTMENT (OUTPATIENT)
Dept: INTERNAL MEDICINE | Facility: CLINIC | Age: 64
End: 2024-11-22
Payer: COMMERCIAL

## 2024-11-22 VITALS
OXYGEN SATURATION: 96 % | TEMPERATURE: 98.1 F | BODY MASS INDEX: 37.22 KG/M2 | HEART RATE: 65 BPM | WEIGHT: 260 LBS | DIASTOLIC BLOOD PRESSURE: 83 MMHG | HEIGHT: 70 IN | SYSTOLIC BLOOD PRESSURE: 130 MMHG

## 2024-11-22 DIAGNOSIS — E11.9 TYPE 2 DIABETES MELLITUS W/OUT COMPLICATIONS: ICD-10-CM

## 2024-11-22 DIAGNOSIS — I89.0 LYMPHEDEMA, NOT ELSEWHERE CLASSIFIED: ICD-10-CM

## 2024-11-22 PROCEDURE — 36415 COLL VENOUS BLD VENIPUNCTURE: CPT

## 2024-11-22 PROCEDURE — 99213 OFFICE O/P EST LOW 20 MIN: CPT | Mod: 25

## 2024-11-24 LAB
ALBUMIN SERPL ELPH-MCNC: 4.3 G/DL
ALP BLD-CCNC: 105 U/L
ALT SERPL-CCNC: 22 U/L
ANION GAP SERPL CALC-SCNC: 12 MMOL/L
AST SERPL-CCNC: 23 U/L
BILIRUB SERPL-MCNC: 0.7 MG/DL
BUN SERPL-MCNC: 12 MG/DL
CALCIUM SERPL-MCNC: 9.7 MG/DL
CANCER AG19-9 SERPL-ACNC: 7 U/ML
CHLORIDE SERPL-SCNC: 102 MMOL/L
CO2 SERPL-SCNC: 26 MMOL/L
CREAT SERPL-MCNC: 0.95 MG/DL
EGFR: 89 ML/MIN/1.73M2
GLUCOSE SERPL-MCNC: 165 MG/DL
INSULIN SERPL-MCNC: 28.6 UU/ML
LPL SERPL-CCNC: 35 U/L
POTASSIUM SERPL-SCNC: 4.9 MMOL/L
PROT SERPL-MCNC: 7 G/DL
SODIUM SERPL-SCNC: 139 MMOL/L

## 2025-01-31 ENCOUNTER — EMERGENCY (EMERGENCY)
Facility: HOSPITAL | Age: 65
LOS: 1 days | Discharge: HOME | End: 2025-01-31
Admitting: EMERGENCY MEDICINE
Payer: COMMERCIAL

## 2025-01-31 VITALS
TEMPERATURE: 98 F | OXYGEN SATURATION: 96 % | SYSTOLIC BLOOD PRESSURE: 147 MMHG | HEART RATE: 76 BPM | DIASTOLIC BLOOD PRESSURE: 76 MMHG | RESPIRATION RATE: 18 BRPM

## 2025-01-31 VITALS
OXYGEN SATURATION: 100 % | RESPIRATION RATE: 95 BRPM | DIASTOLIC BLOOD PRESSURE: 71 MMHG | HEART RATE: 89 BPM | WEIGHT: 250 LBS | SYSTOLIC BLOOD PRESSURE: 147 MMHG | TEMPERATURE: 102 F

## 2025-01-31 DIAGNOSIS — Z90.79 ACQUIRED ABSENCE OF OTHER GENITAL ORGAN(S): Chronic | ICD-10-CM

## 2025-01-31 LAB
ALBUMIN SERPL ELPH-MCNC: 3.9 G/DL — SIGNIFICANT CHANGE UP (ref 3.3–5)
ALP SERPL-CCNC: 58 U/L — SIGNIFICANT CHANGE UP (ref 40–120)
ALT FLD-CCNC: 28 U/L — SIGNIFICANT CHANGE UP (ref 4–41)
ANION GAP SERPL CALC-SCNC: 11 MMOL/L — SIGNIFICANT CHANGE UP (ref 7–14)
AST SERPL-CCNC: 56 U/L — HIGH (ref 4–40)
BASOPHILS # BLD AUTO: 0.01 K/UL — SIGNIFICANT CHANGE UP (ref 0–0.2)
BASOPHILS NFR BLD AUTO: 0.2 % — SIGNIFICANT CHANGE UP (ref 0–2)
BILIRUB SERPL-MCNC: 0.4 MG/DL — SIGNIFICANT CHANGE UP (ref 0.2–1.2)
BLOOD GAS VENOUS COMPREHENSIVE RESULT: SIGNIFICANT CHANGE UP
BUN SERPL-MCNC: 9 MG/DL — SIGNIFICANT CHANGE UP (ref 7–23)
CALCIUM SERPL-MCNC: 8.9 MG/DL — SIGNIFICANT CHANGE UP (ref 8.4–10.5)
CHLORIDE SERPL-SCNC: 97 MMOL/L — LOW (ref 98–107)
CO2 SERPL-SCNC: 25 MMOL/L — SIGNIFICANT CHANGE UP (ref 22–31)
CREAT SERPL-MCNC: 0.99 MG/DL — SIGNIFICANT CHANGE UP (ref 0.5–1.3)
EGFR: 85 ML/MIN/1.73M2 — SIGNIFICANT CHANGE UP
EOSINOPHIL # BLD AUTO: 0 K/UL — SIGNIFICANT CHANGE UP (ref 0–0.5)
EOSINOPHIL NFR BLD AUTO: 0 % — SIGNIFICANT CHANGE UP (ref 0–6)
FLUAV AG NPH QL: DETECTED
FLUBV AG NPH QL: SIGNIFICANT CHANGE UP
GLUCOSE SERPL-MCNC: 108 MG/DL — HIGH (ref 70–99)
HCT VFR BLD CALC: 33.1 % — LOW (ref 39–50)
HGB BLD-MCNC: 10.3 G/DL — LOW (ref 13–17)
IANC: 4.01 K/UL — SIGNIFICANT CHANGE UP (ref 1.8–7.4)
IMM GRANULOCYTES NFR BLD AUTO: 0.5 % — SIGNIFICANT CHANGE UP (ref 0–0.9)
LYMPHOCYTES # BLD AUTO: 1.6 K/UL — SIGNIFICANT CHANGE UP (ref 1–3.3)
LYMPHOCYTES # BLD AUTO: 26.2 % — SIGNIFICANT CHANGE UP (ref 13–44)
MCHC RBC-ENTMCNC: 26.8 PG — LOW (ref 27–34)
MCHC RBC-ENTMCNC: 31.1 G/DL — LOW (ref 32–36)
MCV RBC AUTO: 86 FL — SIGNIFICANT CHANGE UP (ref 80–100)
MONOCYTES # BLD AUTO: 0.45 K/UL — SIGNIFICANT CHANGE UP (ref 0–0.9)
MONOCYTES NFR BLD AUTO: 7.4 % — SIGNIFICANT CHANGE UP (ref 2–14)
NEUTROPHILS # BLD AUTO: 4.01 K/UL — SIGNIFICANT CHANGE UP (ref 1.8–7.4)
NEUTROPHILS NFR BLD AUTO: 65.7 % — SIGNIFICANT CHANGE UP (ref 43–77)
NRBC # BLD AUTO: 0 K/UL — SIGNIFICANT CHANGE UP (ref 0–0)
NRBC # BLD: 0 /100 WBCS — SIGNIFICANT CHANGE UP (ref 0–0)
NRBC # FLD: 0 K/UL — SIGNIFICANT CHANGE UP (ref 0–0)
NRBC BLD-RTO: 0 /100 WBCS — SIGNIFICANT CHANGE UP (ref 0–0)
PLATELET # BLD AUTO: 189 K/UL — SIGNIFICANT CHANGE UP (ref 150–400)
POTASSIUM SERPL-MCNC: 3.8 MMOL/L — SIGNIFICANT CHANGE UP (ref 3.5–5.3)
POTASSIUM SERPL-SCNC: 3.8 MMOL/L — SIGNIFICANT CHANGE UP (ref 3.5–5.3)
PROT SERPL-MCNC: 6.8 G/DL — SIGNIFICANT CHANGE UP (ref 6–8.3)
RBC # BLD: 3.85 M/UL — LOW (ref 4.2–5.8)
RBC # FLD: 14 % — SIGNIFICANT CHANGE UP (ref 10.3–14.5)
RSV RNA NPH QL NAA+NON-PROBE: SIGNIFICANT CHANGE UP
SARS-COV-2 RNA SPEC QL NAA+PROBE: SIGNIFICANT CHANGE UP
SODIUM SERPL-SCNC: 133 MMOL/L — LOW (ref 135–145)
WBC # BLD: 6.1 K/UL — SIGNIFICANT CHANGE UP (ref 3.8–10.5)
WBC # FLD AUTO: 6.1 K/UL — SIGNIFICANT CHANGE UP (ref 3.8–10.5)

## 2025-01-31 PROCEDURE — 93010 ELECTROCARDIOGRAM REPORT: CPT

## 2025-01-31 PROCEDURE — 71046 X-RAY EXAM CHEST 2 VIEWS: CPT | Mod: 26

## 2025-01-31 PROCEDURE — 99284 EMERGENCY DEPT VISIT MOD MDM: CPT

## 2025-01-31 RX ORDER — BACTERIOSTATIC SODIUM CHLORIDE 0.9 %
1000 VIAL (ML) INJECTION ONCE
Refills: 0 | Status: COMPLETED | OUTPATIENT
Start: 2025-01-31 | End: 2025-01-31

## 2025-01-31 RX ORDER — KETOROLAC TROMETHAMINE 10 MG
15 TABLET ORAL ONCE
Refills: 0 | Status: DISCONTINUED | OUTPATIENT
Start: 2025-01-31 | End: 2025-01-31

## 2025-01-31 RX ORDER — IBUPROFEN 600 MG/1
1 TABLET, FILM COATED ORAL
Qty: 25 | Refills: 0
Start: 2025-01-31

## 2025-01-31 RX ORDER — ACETAMINOPHEN 160 MG/5ML
1000 SUSPENSION ORAL ONCE
Refills: 0 | Status: COMPLETED | OUTPATIENT
Start: 2025-01-31 | End: 2025-01-31

## 2025-01-31 RX ORDER — OSELTAMIVIR PHOSPHATE 75 MG/1
1 CAPSULE ORAL
Qty: 10 | Refills: 0
Start: 2025-01-31

## 2025-01-31 RX ADMIN — ACETAMINOPHEN 400 MILLIGRAM(S): 160 SUSPENSION ORAL at 04:00

## 2025-01-31 RX ADMIN — Medication 15 MILLIGRAM(S): at 04:00

## 2025-01-31 RX ADMIN — Medication 1000 MILLILITER(S): at 04:00

## 2025-01-31 NOTE — ED PROVIDER NOTE - OBJECTIVE STATEMENT
64-year-old male history of diabetes presents with a complaint of generalized body pain associated with a cough fever times few days.  Patient denies recent travel, known sick contacts prior to the onset of symptoms.

## 2025-01-31 NOTE — ED PROVIDER NOTE - PATIENT PORTAL LINK FT
You can access the FollowMyHealth Patient Portal offered by [facility] by registering at the following website: [email or web address]. By joining [facility]’s FollowMyHealth portal, you will also be able to view your health information using other applications (apps) compatible with our system.

## 2025-01-31 NOTE — ED PROVIDER NOTE - NSFOLLOWUPINSTRUCTIONS_ED_ALL_ED_FT
INFLUENZA - General Information    Influenza    WHAT YOU NEED TO KNOW:    What is influenza (the flu)? The flu is a viral infection of the lungs and airways. The virus spreads through droplets in the air when someone with flu coughs or sneezes. You may also get the virus through close contact with someone who has the flu. For example, a person with the virus on his or her hands can spread it by shaking hands with someone. You may spread the flu to others for 1 week or longer after signs or symptoms appear.    What increases my risk for the flu?    Living with or caring for someone who has the flu    Living in a nursing home or long-term care facility    Living in close quarters with others    A medical condition such as diabetes, cancer, heart disease, or lung disease    Pregnancy    Age older than 50 years    A weak immune system caused by HIV, AIDS, an organ transplant, or another condition    Traveling to places where other people have the flu  What are the signs and symptoms of the flu?    Fever and chills    Headaches, body aches, and muscle or joint pain    Cough, runny nose, and sore throat    Loss of appetite, nausea, vomiting, or diarrhea    Tiredness    Trouble breathing  How is the flu diagnosed? Your healthcare provider will examine you and ask if you have other health conditions. Tell your provider if you have traveled recently or were around anyone who is sick. A sample of fluid may be collected from your nose or throat to be tested for the flu virus.    How is the flu treated? Most people get better within 7 to 10 days. You may need any of the following:    Acetaminophen decreases pain and fever. It is available without a doctor's order. Ask how much to take and how often to take it. Follow directions. Read the labels of all other medicines you are using to see if they also contain acetaminophen, or ask your doctor or pharmacist. Acetaminophen can cause liver damage if not taken correctly.    NSAIDs, such as ibuprofen, help decrease swelling, pain, and fever. This medicine is available with or without a doctor's order. NSAIDs can cause stomach bleeding or kidney problems in certain people. If you take blood thinner medicine, always ask your healthcare provider if NSAIDs are safe for you. Always read the medicine label and follow directions.    Antivirals help treat viral infections.  How can I manage my symptoms?    Rest as much as you can to help you recover.    Drink liquids as directed to help prevent dehydration. Ask how much liquid to drink each day and which liquids are best for you.    Gargle with warm salt water to soothe your sore throat. Your healthcare provider may also recommend throat lozenges or sprays.    Use a cool mist humidifier or vaporizer to ease your breathing and unplug your nose.  Humidifier  What can I do to prevent the spread of germs?      Wash your hands often. Wash your hands several times each day. Wash after you use the bathroom, change a child's diaper, and before you prepare or eat food. Use soap and water every time. Rub your soapy hands together, lacing your fingers. Wash the front and back of your hands, and in between your fingers. Use the fingers of one hand to scrub under the fingernails of the other hand. Wash for at least 20 seconds. Rinse with warm, running water for several seconds. Then dry your hands with a clean towel or paper towel. Use hand  that contains alcohol if soap and water are not available. Do not touch your eyes, nose, or mouth without washing your hands first.  Handwashing      Cover a sneeze or cough. Use a tissue that covers your mouth and nose. Throw the tissue away in a trash can right away. Use the bend of your arm if a tissue is not available. Wash your hands well with soap and water or use hand .    Stay home if you are sick. Avoid crowds during the first 1 to 4 days of illness.    Ask about vaccines you may need. Talk to your healthcare provider about your vaccine history. Your provider can tell you which vaccines you need, and when to get them.  Get the flu vaccine as soon as recommended. The vaccine may be available starting in September or October. Flu viruses change, so it is important to get a flu vaccine every year.    Get a COVID-19 vaccine as recommended. Vaccination is recommended for everyone 6 months or older. Your provider can tell you when and how often to get booster doses.    Get the pneumonia vaccine if recommended. This vaccine is usually recommended every 5 years. Your provider will tell you when to get this vaccine, if needed.  Call your local emergency number (911 in the [place]) if:    You have trouble breathing, and your lips look purple or blue.    You have a seizure.    You have new pain or pressure in your chest.  When should I seek immediate care?    You are dizzy, or you are urinating less or not at all.    You have a headache with a stiff neck, and you feel tired or confused.    You have new pain or pressure in your chest.    Your symptoms, such as shortness of breath, vomiting, or diarrhea, get worse.    Your symptoms, such as fever and coughing, seem to get better, but then get worse.  When should I call my doctor?    You have new muscle pain or weakness.    You have questions or concerns about your condition or care.  CARE AGREEMENT:    You have the right to help plan your care. Learn about your health condition and how it may be treated. Discuss treatment options with your healthcare providers to decide what care you want to receive. You always have the right to refuse treatment.

## 2025-01-31 NOTE — ED PROVIDER NOTE - CCCP TRG CHIEF CMPLNT
Note Text (......Xxx Chief Complaint.): This diagnosis correlates with the Detail Level: Zone Other (Free Text): Patient was quoted $120 for the removal of 2 SKs on L forehead cough

## 2025-01-31 NOTE — ED PROVIDER NOTE - CONSTITUTIONAL, MLM
ill appearing. Well appearing, awake, alert, oriented to person, place, time/situation and in no apparent distress. normal...

## 2025-01-31 NOTE — ED ADULT NURSE NOTE - OBJECTIVE STATEMENT
64 year old AO4 ambulatory male c/o generalized body aches, fever, chills.. PMHx of lymphedema, HTN, DM. States the symptoms have been ongoing for couple of days now. Denies chest pain, SOB, nausea, vomiting, headache. RR even and unlabored. Right 18g IV placed, labs drawn, medicated as per eMAR. Pending imaging. Pt safety maintained.

## 2025-01-31 NOTE — ED PROVIDER NOTE - CLINICAL SUMMARY MEDICAL DECISION MAKING FREE TEXT BOX
64-year-old male history of diabetes presents with a complaint of generalized body pain associated with a cough fever times few days. On presentation patient ill-appearing, febrile at triage, rest of vital stable.  Symptoms likely due to viral syndrome will work patient up for possible pneumonia.  Plan for routine labs including blood culture, blood gas, chest x-ray will treat with IV fluid, antipyretics

## 2025-01-31 NOTE — ED PROVIDER NOTE - ADDITIONAL NOTES AND INSTRUCTIONS:
Please excuse the absence of Mr. Bauman. He was evaluated in the emergency room. He may return to work on 2/4/2025.

## 2025-01-31 NOTE — ED ADULT TRIAGE NOTE - CHIEF COMPLAINT QUOTE
Pt c/o generalized weakness, coughing, chills, dizziness, since sunday. Pt states it feels like the last time he had pneumonia. No complaints of chest pain, headache, nausea,  vomiting  SOB, verbalized..

## 2025-02-03 ENCOUNTER — NON-APPOINTMENT (OUTPATIENT)
Age: 65
End: 2025-02-03

## 2025-02-03 DIAGNOSIS — J11.1 INFLUENZA DUE TO UNIDENTIFIED INFLUENZA VIRUS WITH OTHER RESPIRATORY MANIFESTATIONS: ICD-10-CM

## 2025-02-03 RX ORDER — GUAIFENESIN AND CODEINE PHOSPHATE 10; 100 MG/5ML; MG/5ML
100-10 SOLUTION ORAL 4 TIMES DAILY
Qty: 240 | Refills: 0 | Status: ACTIVE | COMMUNITY
Start: 2025-02-03

## 2025-02-03 RX ORDER — FLUTICASONE PROPIONATE AND SALMETEROL 250; 50 UG/1; UG/1
250-50 POWDER RESPIRATORY (INHALATION)
Qty: 1 | Refills: 3 | Status: ACTIVE | COMMUNITY
Start: 2025-02-03 | End: 1900-01-01

## 2025-02-05 LAB
CULTURE RESULTS: SIGNIFICANT CHANGE UP
CULTURE RESULTS: SIGNIFICANT CHANGE UP
SPECIMEN SOURCE: SIGNIFICANT CHANGE UP
SPECIMEN SOURCE: SIGNIFICANT CHANGE UP

## 2025-03-06 NOTE — REVIEW OF SYSTEMS
Patient Quality Outreach    Patient is due for the following:   Depression  -  DAP      Topic Date Due    Zoster (Shingles) Vaccine (1 of 2) Never done    COVID-19 Vaccine (4 - 2024-25 season) 09/01/2024     Uric Acid level (Lab)  Drug Screen (Urine)    Action(s) Taken:   Patient has upcoming appointment, these items will be addressed at that time.    Type of outreach:    Chart review performed, no outreach needed.    Questions for provider review:    None           Christian Cowan MA  Chart routed to Care Team.       [As Noted in HPI] : as noted in HPI [Negative] : Neurological

## 2025-03-28 ENCOUNTER — APPOINTMENT (OUTPATIENT)
Dept: INTERNAL MEDICINE | Facility: CLINIC | Age: 65
End: 2025-03-28

## 2025-04-10 ENCOUNTER — APPOINTMENT (OUTPATIENT)
Dept: INTERNAL MEDICINE | Facility: CLINIC | Age: 65
End: 2025-04-10
Payer: COMMERCIAL

## 2025-04-10 VITALS
HEART RATE: 75 BPM | WEIGHT: 258 LBS | SYSTOLIC BLOOD PRESSURE: 151 MMHG | DIASTOLIC BLOOD PRESSURE: 89 MMHG | HEIGHT: 70 IN | OXYGEN SATURATION: 95 % | BODY MASS INDEX: 36.94 KG/M2 | TEMPERATURE: 97.9 F

## 2025-04-10 DIAGNOSIS — I89.0 LYMPHEDEMA, NOT ELSEWHERE CLASSIFIED: ICD-10-CM

## 2025-04-10 DIAGNOSIS — M10.9 GOUT, UNSPECIFIED: ICD-10-CM

## 2025-04-10 DIAGNOSIS — E11.9 TYPE 2 DIABETES MELLITUS W/OUT COMPLICATIONS: ICD-10-CM

## 2025-04-10 DIAGNOSIS — I10 ESSENTIAL (PRIMARY) HYPERTENSION: ICD-10-CM

## 2025-04-10 DIAGNOSIS — Z00.00 ENCOUNTER FOR GENERAL ADULT MEDICAL EXAMINATION W/OUT ABNORMAL FINDINGS: ICD-10-CM

## 2025-04-10 DIAGNOSIS — C61 MALIGNANT NEOPLASM OF PROSTATE: ICD-10-CM

## 2025-04-10 PROCEDURE — 36415 COLL VENOUS BLD VENIPUNCTURE: CPT

## 2025-04-10 PROCEDURE — 99396 PREV VISIT EST AGE 40-64: CPT

## 2025-04-14 ENCOUNTER — RX RENEWAL (OUTPATIENT)
Age: 65
End: 2025-04-14

## 2025-04-14 LAB
ALBUMIN SERPL ELPH-MCNC: 4.3 G/DL
ALP BLD-CCNC: 98 U/L
ALT SERPL-CCNC: 15 U/L
ANION GAP SERPL CALC-SCNC: 9 MMOL/L
APPEARANCE: CLEAR
AST SERPL-CCNC: 20 U/L
BILIRUB SERPL-MCNC: 0.7 MG/DL
BILIRUBIN URINE: NEGATIVE
BLOOD URINE: NEGATIVE
BUN SERPL-MCNC: 11 MG/DL
CALCIUM SERPL-MCNC: 9.4 MG/DL
CHLORIDE SERPL-SCNC: 101 MMOL/L
CHOLEST SERPL-MCNC: 153 MG/DL
CO2 SERPL-SCNC: 27 MMOL/L
COLOR: YELLOW
CREAT SERPL-MCNC: 0.94 MG/DL
EGFRCR SERPLBLD CKD-EPI 2021: 91 ML/MIN/1.73M2
ESTIMATED AVERAGE GLUCOSE: 163 MG/DL
GLUCOSE QUALITATIVE U: NEGATIVE MG/DL
GLUCOSE SERPL-MCNC: 108 MG/DL
HBA1C MFR BLD HPLC: 7.3 %
HCT VFR BLD CALC: 34.3 %
HDLC SERPL-MCNC: 51 MG/DL
HGB BLD-MCNC: 10.4 G/DL
KETONES URINE: NEGATIVE MG/DL
LDLC SERPL-MCNC: 91 MG/DL
LEUKOCYTE ESTERASE URINE: NEGATIVE
MAGNESIUM SERPL-MCNC: 1.9 MG/DL
MCHC RBC-ENTMCNC: 26.8 PG
MCHC RBC-ENTMCNC: 30.3 G/DL
MCV RBC AUTO: 88.4 FL
NITRITE URINE: NEGATIVE
NONHDLC SERPL-MCNC: 102 MG/DL
PH URINE: 5.5
PHOSPHATE SERPL-MCNC: 3.6 MG/DL
PLATELET # BLD AUTO: 286 K/UL
POTASSIUM SERPL-SCNC: 4.8 MMOL/L
PROT SERPL-MCNC: 6.8 G/DL
PROTEIN URINE: NORMAL MG/DL
RBC # BLD: 3.88 M/UL
RBC # FLD: 15 %
SODIUM SERPL-SCNC: 137 MMOL/L
SPECIFIC GRAVITY URINE: 1.02
T4 FREE SERPL-MCNC: 1.1 NG/DL
TRIGL SERPL-MCNC: 47 MG/DL
TSH SERPL-ACNC: 1.71 UIU/ML
URATE SERPL-MCNC: 4.1 MG/DL
UROBILINOGEN URINE: 0.2 MG/DL
WBC # FLD AUTO: 4.25 K/UL

## 2025-07-05 ENCOUNTER — RX RENEWAL (OUTPATIENT)
Age: 65
End: 2025-07-05

## 2025-07-05 RX ORDER — METFORMIN HYDROCHLORIDE 750 MG/1
750 TABLET ORAL
Qty: 270 | Refills: 3 | Status: ACTIVE | COMMUNITY
Start: 2025-07-05 | End: 1900-01-01

## 2025-07-17 ENCOUNTER — NON-APPOINTMENT (OUTPATIENT)
Age: 65
End: 2025-07-17